# Patient Record
Sex: FEMALE | Race: BLACK OR AFRICAN AMERICAN | Employment: PART TIME | URBAN - METROPOLITAN AREA
[De-identification: names, ages, dates, MRNs, and addresses within clinical notes are randomized per-mention and may not be internally consistent; named-entity substitution may affect disease eponyms.]

---

## 2017-04-01 LAB
ABO/RH: ABNORMAL
ANTIBODY SCREEN: ABNORMAL
ANTIBODY SCREEN: POSITIVE
CHLAMYDIA CULTURE: NORMAL
CULTURE, GONORRHOEAE: NORMAL
HCT VFR BLD CALC: 34 % (ref 36–46)
HCT VFR BLD CALC: 34 % (ref 36–46)
HEMOGLOBIN: 11.7 G/DL (ref 12–16)
HEMOGLOBIN: 11.7 G/DL (ref 12–16)
HEPATITIS B SURFACE ANTIGEN: NEGATIVE
HEPATITIS B SURFACE ANTIGEN: NEGATIVE
HIV AG/AB: NONREACTIVE
HIV AG/AB: NORMAL
MCV RBC AUTO: 79 FL
PLATELET COUNT: 136
PLATELET COUNT: 136
RUBV IGG SER QL: 1.8
RUBV IGG SER QL: ABNORMAL
T. PALLIDUM, IGG: ABNORMAL
T. PALLIDUM, IGG: NONREACTIVE

## 2017-04-07 ENCOUNTER — INITIAL PRENATAL (OUTPATIENT)
Dept: OBGYN CLINIC | Age: 22
End: 2017-04-07
Payer: COMMERCIAL

## 2017-04-07 ENCOUNTER — HOSPITAL ENCOUNTER (OUTPATIENT)
Age: 22
Setting detail: SPECIMEN
Discharge: HOME | End: 2017-04-07
Payer: COMMERCIAL

## 2017-04-07 VITALS — HEART RATE: 102 BPM | WEIGHT: 147 LBS | SYSTOLIC BLOOD PRESSURE: 118 MMHG | DIASTOLIC BLOOD PRESSURE: 72 MMHG

## 2017-04-07 DIAGNOSIS — Z3A.21 21 WEEKS GESTATION OF PREGNANCY: Primary | ICD-10-CM

## 2017-04-07 DIAGNOSIS — D69.1 MATERNAL PLATELET DISORDER AFFECTING PREGNANCY IN SECOND TRIMESTER, ANTEPARTUM (HCC): ICD-10-CM

## 2017-04-07 DIAGNOSIS — O09.32 LATE PRENATAL CARE IN SECOND TRIMESTER: ICD-10-CM

## 2017-04-07 DIAGNOSIS — O99.112 MATERNAL PLATELET DISORDER AFFECTING PREGNANCY IN SECOND TRIMESTER, ANTEPARTUM (HCC): ICD-10-CM

## 2017-04-07 LAB
DIRECT EXAM: ABNORMAL
Lab: ABNORMAL
SPECIMEN DESCRIPTION: ABNORMAL
STATUS: ABNORMAL

## 2017-04-07 PROCEDURE — 99204 OFFICE O/P NEW MOD 45 MIN: CPT | Performed by: ADVANCED PRACTICE MIDWIFE

## 2017-04-07 RX ORDER — PNV,CALCIUM 72/IRON/FOLIC ACID 27 MG-1 MG
TABLET ORAL
Refills: 8 | COMMUNITY
Start: 2017-04-01 | End: 2019-04-03 | Stop reason: ALTCHOICE

## 2017-04-11 RX ORDER — METRONIDAZOLE 500 MG/1
500 TABLET ORAL ONCE
Qty: 4 TABLET | Refills: 0 | Status: SHIPPED | OUTPATIENT
Start: 2017-04-11 | End: 2017-04-11

## 2017-04-27 ENCOUNTER — HOSPITAL ENCOUNTER (OUTPATIENT)
Age: 22
Discharge: HOME | End: 2017-04-27
Payer: COMMERCIAL

## 2017-04-27 ENCOUNTER — ROUTINE PRENATAL (OUTPATIENT)
Dept: PERINATAL CARE | Age: 22
End: 2017-04-27
Payer: COMMERCIAL

## 2017-04-27 VITALS
BODY MASS INDEX: 28.32 KG/M2 | TEMPERATURE: 97.9 F | WEIGHT: 150 LBS | HEART RATE: 97 BPM | SYSTOLIC BLOOD PRESSURE: 118 MMHG | DIASTOLIC BLOOD PRESSURE: 70 MMHG | RESPIRATION RATE: 16 BRPM | HEIGHT: 61 IN

## 2017-04-27 DIAGNOSIS — D69.1 MATERNAL PLATELET DISORDER AFFECTING PREGNANCY IN SECOND TRIMESTER, ANTEPARTUM (HCC): ICD-10-CM

## 2017-04-27 DIAGNOSIS — O99.112 MATERNAL PLATELET DISORDER AFFECTING PREGNANCY IN SECOND TRIMESTER, ANTEPARTUM (HCC): ICD-10-CM

## 2017-04-27 DIAGNOSIS — O09.32 LATE PRENATAL CARE IN SECOND TRIMESTER: Primary | ICD-10-CM

## 2017-04-27 DIAGNOSIS — Z36.86 ENCOUNTER FOR SCREENING FOR RISK OF PRE-TERM LABOR: ICD-10-CM

## 2017-04-27 DIAGNOSIS — Z3A.23 23 WEEKS GESTATION OF PREGNANCY: ICD-10-CM

## 2017-04-27 PROCEDURE — 36415 COLL VENOUS BLD VENIPUNCTURE: CPT

## 2017-04-27 PROCEDURE — 76817 TRANSVAGINAL US OBSTETRIC: CPT | Performed by: OBSTETRICS & GYNECOLOGY

## 2017-04-27 PROCEDURE — 76811 OB US DETAILED SNGL FETUS: CPT | Performed by: OBSTETRICS & GYNECOLOGY

## 2017-04-27 PROCEDURE — 81511 FTL CGEN ABNOR FOUR ANAL: CPT

## 2017-04-28 LAB
AFP INTERPRETATION: NORMAL
AFP MOM: 0.92
AFP QUAD INTERPRETATION: NORMAL
AFP SPECIMEN: NORMAL
AFP: 96 NG/ML
DATE OF BIRTH: NORMAL
DATING METHOD: NORMAL
DETERMINED BY: NORMAL
DIABETIC: NO
DIMERIC INHIBIN A: 184 PG/ML
DUE DATE: NORMAL
ESTIMATED DUE DATE: NORMAL
FAMILY HISTORY NTD: NO
GESTATIONAL AGE: 23.14 WEEKS
HISTORY OF ANEUPLOIDY?: NORMAL
INHIBIN A MOM: 0.78
INSULIN REQ DIABETES: NO
LAST MENSTRUAL PERIOD: NORMAL
MATERNAL AGE AT EDD: 19.8 YR
MATERNAL WEIGHT: 150
MSHCG-MOM: 0.35
MSHCG: 6776 IU/L
NUMBER OF FETUSES: NORMAL
PATIENT WEIGHT: 150 LBS
PHYSICIAN: NORMAL
RACE (MATERNAL): NORMAL
RACE: NORMAL
UE3 MOM: 1.14
UE3 VALUE: 3.69 NG/ML
ZZ NTE CLEAN UP: HISTORY: NO

## 2017-05-16 ENCOUNTER — HOSPITAL ENCOUNTER (OUTPATIENT)
Age: 22
Setting detail: SPECIMEN
Discharge: HOME | End: 2017-05-16
Payer: COMMERCIAL

## 2017-05-16 ENCOUNTER — ROUTINE PRENATAL (OUTPATIENT)
Dept: OBGYN CLINIC | Age: 22
End: 2017-05-16
Payer: COMMERCIAL

## 2017-05-16 VITALS
WEIGHT: 153 LBS | HEART RATE: 106 BPM | DIASTOLIC BLOOD PRESSURE: 77 MMHG | BODY MASS INDEX: 28.91 KG/M2 | SYSTOLIC BLOOD PRESSURE: 142 MMHG

## 2017-05-16 DIAGNOSIS — D69.1 MATERNAL PLATELET DISORDER AFFECTING PREGNANCY IN SECOND TRIMESTER, ANTEPARTUM (HCC): Primary | ICD-10-CM

## 2017-05-16 DIAGNOSIS — A59.9 TRICHIMONIASIS: ICD-10-CM

## 2017-05-16 DIAGNOSIS — O99.112 MATERNAL PLATELET DISORDER AFFECTING PREGNANCY IN SECOND TRIMESTER, ANTEPARTUM (HCC): Primary | ICD-10-CM

## 2017-05-16 DIAGNOSIS — Z3A.25 25 WEEKS GESTATION OF PREGNANCY: ICD-10-CM

## 2017-05-16 DIAGNOSIS — D69.1 MATERNAL PLATELET DISORDER AFFECTING PREGNANCY IN SECOND TRIMESTER, ANTEPARTUM (HCC): ICD-10-CM

## 2017-05-16 DIAGNOSIS — O99.112 MATERNAL PLATELET DISORDER AFFECTING PREGNANCY IN SECOND TRIMESTER, ANTEPARTUM (HCC): ICD-10-CM

## 2017-05-16 DIAGNOSIS — O09.32 LATE PRENATAL CARE IN SECOND TRIMESTER: ICD-10-CM

## 2017-05-16 LAB
ABSOLUTE EOS #: 0.3 K/UL (ref 0–0.4)
ABSOLUTE LYMPH #: 1.6 K/UL (ref 1.2–5.2)
ABSOLUTE MONO #: 0.3 K/UL (ref 0.1–1.4)
BASOPHILS # BLD: 0 %
BASOPHILS ABSOLUTE: 0 K/UL (ref 0–0.2)
DIFFERENTIAL TYPE: ABNORMAL
DIRECT EXAM: ABNORMAL
EOSINOPHILS RELATIVE PERCENT: 4 %
GLUCOSE ADMINISTRATION: ABNORMAL
GLUCOSE TOLERANCE SCREEN 50G: 145 MG/DL (ref 70–135)
HCT VFR BLD CALC: 31.9 % (ref 36–46)
HEMOGLOBIN: 10.9 G/DL (ref 12–16)
LYMPHOCYTES # BLD: 22 %
Lab: ABNORMAL
MCH RBC QN AUTO: 27.8 PG (ref 26–34)
MCHC RBC AUTO-ENTMCNC: 34.1 G/DL (ref 31–37)
MCV RBC AUTO: 81.5 FL (ref 80–100)
MONOCYTES # BLD: 4 %
PDW BLD-RTO: 15.5 % (ref 12.5–15.4)
PLATELET # BLD: 136 K/UL (ref 140–450)
PLATELET ESTIMATE: ABNORMAL
PMV BLD AUTO: 9.8 FL (ref 6–12)
RBC # BLD: 3.91 M/UL (ref 4–5.2)
RBC # BLD: ABNORMAL 10*6/UL
SEG NEUTROPHILS: 70 %
SEGMENTED NEUTROPHILS ABSOLUTE COUNT: 5.1 K/UL (ref 1.8–8)
SPECIMEN DESCRIPTION: ABNORMAL
STATUS: ABNORMAL
WBC # BLD: 7.2 K/UL (ref 4.5–13.5)
WBC # BLD: ABNORMAL 10*3/UL

## 2017-05-16 PROCEDURE — 99213 OFFICE O/P EST LOW 20 MIN: CPT | Performed by: ADVANCED PRACTICE MIDWIFE

## 2017-05-16 PROCEDURE — 36415 COLL VENOUS BLD VENIPUNCTURE: CPT | Performed by: ADVANCED PRACTICE MIDWIFE

## 2017-05-18 ENCOUNTER — TELEPHONE (OUTPATIENT)
Dept: OBGYN CLINIC | Age: 22
End: 2017-05-18

## 2017-05-18 DIAGNOSIS — R73.09 ELEVATED GLUCOSE TOLERANCE TEST: Primary | ICD-10-CM

## 2017-05-18 DIAGNOSIS — B96.89 BACTERIAL VAGINAL INFECTION: ICD-10-CM

## 2017-05-18 DIAGNOSIS — N76.0 BACTERIAL VAGINAL INFECTION: ICD-10-CM

## 2017-05-18 RX ORDER — METRONIDAZOLE 500 MG/1
500 TABLET ORAL 2 TIMES DAILY
Qty: 14 TABLET | Refills: 0 | Status: SHIPPED | OUTPATIENT
Start: 2017-05-18 | End: 2017-05-25

## 2017-06-06 ENCOUNTER — TELEPHONE (OUTPATIENT)
Dept: OBGYN CLINIC | Age: 22
End: 2017-06-06

## 2017-06-07 ENCOUNTER — HOSPITAL ENCOUNTER (OUTPATIENT)
Age: 22
Setting detail: SPECIMEN
Discharge: HOME | End: 2017-06-07
Payer: COMMERCIAL

## 2017-06-07 ENCOUNTER — ROUTINE PRENATAL (OUTPATIENT)
Dept: OBGYN CLINIC | Age: 22
End: 2017-06-07
Payer: COMMERCIAL

## 2017-06-07 VITALS
HEART RATE: 97 BPM | SYSTOLIC BLOOD PRESSURE: 133 MMHG | DIASTOLIC BLOOD PRESSURE: 82 MMHG | BODY MASS INDEX: 30.8 KG/M2 | WEIGHT: 163 LBS

## 2017-06-07 DIAGNOSIS — B96.89 BACTERIAL VAGINAL INFECTION: ICD-10-CM

## 2017-06-07 DIAGNOSIS — N76.0 BACTERIAL VAGINAL INFECTION: ICD-10-CM

## 2017-06-07 DIAGNOSIS — O09.92 SUPERVISION OF HIGH RISK PREGNANCY, ANTEPARTUM, SECOND TRIMESTER: ICD-10-CM

## 2017-06-07 DIAGNOSIS — Z3A.29 29 WEEKS GESTATION OF PREGNANCY: ICD-10-CM

## 2017-06-07 DIAGNOSIS — R73.09 ELEVATED GLUCOSE TOLERANCE TEST: Primary | ICD-10-CM

## 2017-06-07 LAB
DIRECT EXAM: NORMAL
Lab: NORMAL
SPECIMEN DESCRIPTION: NORMAL
STATUS: NORMAL

## 2017-06-07 PROCEDURE — 99213 OFFICE O/P EST LOW 20 MIN: CPT | Performed by: ADVANCED PRACTICE MIDWIFE

## 2017-06-29 ENCOUNTER — ROUTINE PRENATAL (OUTPATIENT)
Dept: PERINATAL CARE | Age: 22
End: 2017-06-29
Payer: COMMERCIAL

## 2017-06-29 VITALS
HEART RATE: 92 BPM | WEIGHT: 165 LBS | DIASTOLIC BLOOD PRESSURE: 76 MMHG | RESPIRATION RATE: 16 BRPM | BODY MASS INDEX: 31.15 KG/M2 | TEMPERATURE: 98.3 F | HEIGHT: 61 IN | SYSTOLIC BLOOD PRESSURE: 122 MMHG

## 2017-06-29 DIAGNOSIS — Z36.4 ANTENATAL SCREENING FOR FETAL GROWTH RETARDATION USING ULTRASONICS: ICD-10-CM

## 2017-06-29 DIAGNOSIS — O09.33 INSUFFICIENT PRENATAL CARE, THIRD TRIMESTER: Primary | ICD-10-CM

## 2017-06-29 DIAGNOSIS — Z13.89 ENCOUNTER FOR ROUTINE SCREENING FOR MALFORMATION USING ULTRASONICS: ICD-10-CM

## 2017-06-29 DIAGNOSIS — O99.810 ABNORMAL MATERNAL GLUCOSE TOLERANCE, ANTEPARTUM: ICD-10-CM

## 2017-06-29 DIAGNOSIS — Z3A.32 32 WEEKS GESTATION OF PREGNANCY: ICD-10-CM

## 2017-06-29 PROCEDURE — 76805 OB US >/= 14 WKS SNGL FETUS: CPT | Performed by: OBSTETRICS & GYNECOLOGY

## 2017-06-29 PROCEDURE — 76819 FETAL BIOPHYS PROFIL W/O NST: CPT | Performed by: OBSTETRICS & GYNECOLOGY

## 2017-07-05 ENCOUNTER — HOSPITAL ENCOUNTER (OUTPATIENT)
Age: 22
Setting detail: SPECIMEN
Discharge: HOME | End: 2017-07-05
Payer: COMMERCIAL

## 2017-07-05 ENCOUNTER — ROUTINE PRENATAL (OUTPATIENT)
Dept: OBGYN CLINIC | Age: 22
End: 2017-07-05
Payer: COMMERCIAL

## 2017-07-05 VITALS
SYSTOLIC BLOOD PRESSURE: 135 MMHG | HEART RATE: 102 BPM | WEIGHT: 167.6 LBS | BODY MASS INDEX: 31.67 KG/M2 | DIASTOLIC BLOOD PRESSURE: 75 MMHG

## 2017-07-05 DIAGNOSIS — D69.1 MATERNAL PLATELET DISORDER AFFECTING PREGNANCY IN SECOND TRIMESTER, ANTEPARTUM (HCC): ICD-10-CM

## 2017-07-05 DIAGNOSIS — R31.9 HEMATURIA: ICD-10-CM

## 2017-07-05 DIAGNOSIS — O09.90 HIGH RISK PREGNANCY, ANTEPARTUM: ICD-10-CM

## 2017-07-05 DIAGNOSIS — O99.112 MATERNAL PLATELET DISORDER AFFECTING PREGNANCY IN SECOND TRIMESTER, ANTEPARTUM (HCC): ICD-10-CM

## 2017-07-05 DIAGNOSIS — Z23 NEED FOR DIPHTHERIA-TETANUS-PERTUSSIS (TDAP) VACCINE: ICD-10-CM

## 2017-07-05 DIAGNOSIS — Z3A.33 33 WEEKS GESTATION OF PREGNANCY: ICD-10-CM

## 2017-07-05 DIAGNOSIS — O09.90 HIGH RISK PREGNANCY, ANTEPARTUM: Primary | ICD-10-CM

## 2017-07-05 LAB
HCT VFR BLD CALC: 34 % (ref 36–46)
HEMOGLOBIN: 11.7 G/DL (ref 12–16)
MCH RBC QN AUTO: 28.1 PG (ref 26–34)
MCHC RBC AUTO-ENTMCNC: 34.3 G/DL (ref 31–37)
MCV RBC AUTO: 82 FL (ref 80–100)
PDW BLD-RTO: 15.3 % (ref 12.5–15.4)
PLATELET # BLD: 129 K/UL (ref 140–450)
PMV BLD AUTO: 9.7 FL (ref 6–12)
RBC # BLD: 4.15 M/UL (ref 4–5.2)
WBC # BLD: 8.4 K/UL (ref 4.5–13.5)

## 2017-07-05 PROCEDURE — 99213 OFFICE O/P EST LOW 20 MIN: CPT | Performed by: ADVANCED PRACTICE MIDWIFE

## 2017-07-05 PROCEDURE — 90715 TDAP VACCINE 7 YRS/> IM: CPT | Performed by: ADVANCED PRACTICE MIDWIFE

## 2017-07-05 PROCEDURE — 90471 IMMUNIZATION ADMIN: CPT | Performed by: ADVANCED PRACTICE MIDWIFE

## 2017-07-06 ENCOUNTER — PATIENT MESSAGE (OUTPATIENT)
Dept: OBGYN CLINIC | Age: 22
End: 2017-07-06

## 2017-07-06 LAB
CULTURE: NORMAL
CULTURE: NORMAL
Lab: NORMAL
SPECIMEN DESCRIPTION: NORMAL
STATUS: NORMAL

## 2017-07-26 ENCOUNTER — HOSPITAL ENCOUNTER (OUTPATIENT)
Age: 22
Setting detail: SPECIMEN
Discharge: HOME | End: 2017-07-26
Payer: COMMERCIAL

## 2017-07-26 ENCOUNTER — ROUTINE PRENATAL (OUTPATIENT)
Dept: OBGYN CLINIC | Age: 22
End: 2017-07-26
Payer: COMMERCIAL

## 2017-07-26 VITALS
WEIGHT: 174 LBS | HEART RATE: 100 BPM | DIASTOLIC BLOOD PRESSURE: 81 MMHG | BODY MASS INDEX: 32.88 KG/M2 | SYSTOLIC BLOOD PRESSURE: 130 MMHG

## 2017-07-26 DIAGNOSIS — Z34.93 PRENATAL CARE, THIRD TRIMESTER: ICD-10-CM

## 2017-07-26 DIAGNOSIS — Z3A.36 36 WEEKS GESTATION OF PREGNANCY: Primary | ICD-10-CM

## 2017-07-26 PROCEDURE — 99213 OFFICE O/P EST LOW 20 MIN: CPT | Performed by: ADVANCED PRACTICE MIDWIFE

## 2017-07-29 LAB
CULTURE: NORMAL
CULTURE: NORMAL
Lab: NORMAL
SPECIMEN DESCRIPTION: NORMAL
STATUS: NORMAL

## 2017-07-31 ENCOUNTER — TELEPHONE (OUTPATIENT)
Dept: OBGYN CLINIC | Age: 22
End: 2017-07-31

## 2017-08-02 ENCOUNTER — ROUTINE PRENATAL (OUTPATIENT)
Dept: OBGYN CLINIC | Age: 22
End: 2017-08-02
Payer: COMMERCIAL

## 2017-08-02 VITALS
BODY MASS INDEX: 33.44 KG/M2 | SYSTOLIC BLOOD PRESSURE: 124 MMHG | WEIGHT: 177 LBS | HEART RATE: 98 BPM | DIASTOLIC BLOOD PRESSURE: 80 MMHG

## 2017-08-02 DIAGNOSIS — O09.90 HIGH RISK PREGNANCY, ANTEPARTUM: Primary | Chronic | ICD-10-CM

## 2017-08-02 DIAGNOSIS — Z3A.37 37 WEEKS GESTATION OF PREGNANCY: ICD-10-CM

## 2017-08-02 DIAGNOSIS — R73.09 ELEVATED GLUCOSE TOLERANCE TEST: ICD-10-CM

## 2017-08-02 DIAGNOSIS — D69.1 MATERNAL PLATELET DISORDER AFFECTING PREGNANCY IN SECOND TRIMESTER, ANTEPARTUM (HCC): ICD-10-CM

## 2017-08-02 DIAGNOSIS — O99.112 MATERNAL PLATELET DISORDER AFFECTING PREGNANCY IN SECOND TRIMESTER, ANTEPARTUM (HCC): ICD-10-CM

## 2017-08-02 PROBLEM — N76.0 BACTERIAL VAGINAL INFECTION: Status: RESOLVED | Noted: 2017-05-18 | Resolved: 2017-08-02

## 2017-08-02 PROBLEM — B96.89 BACTERIAL VAGINAL INFECTION: Status: RESOLVED | Noted: 2017-05-18 | Resolved: 2017-08-02

## 2017-08-02 PROCEDURE — 99213 OFFICE O/P EST LOW 20 MIN: CPT | Performed by: ADVANCED PRACTICE MIDWIFE

## 2017-08-08 ENCOUNTER — ROUTINE PRENATAL (OUTPATIENT)
Dept: OBGYN CLINIC | Age: 22
End: 2017-08-08
Payer: COMMERCIAL

## 2017-08-08 ENCOUNTER — HOSPITAL ENCOUNTER (OUTPATIENT)
Age: 22
Discharge: HOME | End: 2017-08-08
Attending: OBSTETRICS & GYNECOLOGY | Admitting: OBSTETRICS & GYNECOLOGY
Payer: COMMERCIAL

## 2017-08-08 VITALS
BODY MASS INDEX: 33.04 KG/M2 | HEART RATE: 86 BPM | HEIGHT: 61 IN | RESPIRATION RATE: 14 BRPM | DIASTOLIC BLOOD PRESSURE: 74 MMHG | TEMPERATURE: 98.2 F | SYSTOLIC BLOOD PRESSURE: 129 MMHG | WEIGHT: 175 LBS

## 2017-08-08 VITALS — DIASTOLIC BLOOD PRESSURE: 89 MMHG | SYSTOLIC BLOOD PRESSURE: 138 MMHG | BODY MASS INDEX: 33.07 KG/M2 | WEIGHT: 175 LBS

## 2017-08-08 DIAGNOSIS — Z3A.38 38 WEEKS GESTATION OF PREGNANCY: ICD-10-CM

## 2017-08-08 DIAGNOSIS — R03.0 ELEVATED BP WITHOUT DIAGNOSIS OF HYPERTENSION: Primary | ICD-10-CM

## 2017-08-08 PROBLEM — F12.10 TETRAHYDROCANNABINOL (THC) USE DISORDER, MILD, ABUSE: Status: ACTIVE | Noted: 2017-08-08

## 2017-08-08 LAB
-: ABNORMAL
ALBUMIN SERPL-MCNC: 3.2 G/DL (ref 3.5–5.2)
ALBUMIN/GLOBULIN RATIO: 1.1 (ref 1–2.5)
ALP BLD-CCNC: 155 U/L (ref 35–104)
ALT SERPL-CCNC: 10 U/L (ref 5–33)
AMORPHOUS: ABNORMAL
ANION GAP SERPL CALCULATED.3IONS-SCNC: 17 MMOL/L (ref 9–17)
AST SERPL-CCNC: 16 U/L
BACTERIA: ABNORMAL
BILIRUB SERPL-MCNC: 0.32 MG/DL (ref 0.3–1.2)
BILIRUBIN URINE: NEGATIVE
BUN BLDV-MCNC: 4 MG/DL (ref 6–20)
BUN/CREAT BLD: ABNORMAL (ref 9–20)
CALCIUM SERPL-MCNC: 8.8 MG/DL (ref 8.6–10.4)
CASTS UA: ABNORMAL /LPF (ref 0–8)
CHLORIDE BLD-SCNC: 103 MMOL/L (ref 98–107)
CO2: 20 MMOL/L (ref 20–31)
COLOR: ABNORMAL
CREAT SERPL-MCNC: 0.3 MG/DL (ref 0.5–0.9)
CREATININE URINE: 151.2 MG/DL (ref 28–217)
CRYSTALS, UA: ABNORMAL /HPF
EPITHELIAL CELLS UA: ABNORMAL /HPF (ref 0–5)
GFR AFRICAN AMERICAN: ABNORMAL ML/MIN
GFR NON-AFRICAN AMERICAN: ABNORMAL ML/MIN
GFR SERPL CREATININE-BSD FRML MDRD: ABNORMAL ML/MIN/{1.73_M2}
GFR SERPL CREATININE-BSD FRML MDRD: ABNORMAL ML/MIN/{1.73_M2}
GLUCOSE BLD-MCNC: 124 MG/DL (ref 70–99)
GLUCOSE URINE: NEGATIVE
HCT VFR BLD CALC: 36.2 % (ref 36–46)
HEMOGLOBIN: 12.4 G/DL (ref 12–16)
KETONES, URINE: ABNORMAL
LEUKOCYTE ESTERASE, URINE: ABNORMAL
MCH RBC QN AUTO: 28.1 PG (ref 26–34)
MCHC RBC AUTO-ENTMCNC: 34.2 G/DL (ref 31–37)
MCV RBC AUTO: 82.1 FL (ref 80–100)
MUCUS: ABNORMAL
NITRITE, URINE: NEGATIVE
OTHER OBSERVATIONS UA: ABNORMAL
PDW BLD-RTO: 15.2 % (ref 12.5–15.4)
PH UA: 6.5 (ref 5–8)
PLATELET # BLD: 122 K/UL (ref 140–450)
PMV BLD AUTO: 9.5 FL (ref 6–12)
POTASSIUM SERPL-SCNC: 3.5 MMOL/L (ref 3.7–5.3)
PROTEIN UA: ABNORMAL
RBC # BLD: 4.42 M/UL (ref 4–5.2)
RBC UA: ABNORMAL /HPF (ref 0–4)
RENAL EPITHELIAL, UA: ABNORMAL /HPF
SODIUM BLD-SCNC: 140 MMOL/L (ref 135–144)
SPECIFIC GRAVITY UA: 1.02 (ref 1–1.03)
TOTAL PROTEIN, URINE: 44 MG/DL
TOTAL PROTEIN: 6.2 G/DL (ref 6.4–8.3)
TRICHOMONAS: ABNORMAL
TURBIDITY: ABNORMAL
URIC ACID: 3.7 MG/DL (ref 2.4–5.7)
URINE HGB: ABNORMAL
URINE TOTAL PROTEIN CREATININE RATIO: 0.29 (ref 0–0.2)
UROBILINOGEN, URINE: NORMAL
WBC # BLD: 5.6 K/UL (ref 4.5–13.5)
WBC UA: ABNORMAL /HPF (ref 0–5)
YEAST: ABNORMAL

## 2017-08-08 PROCEDURE — 6370000000 HC RX 637 (ALT 250 FOR IP): Performed by: STUDENT IN AN ORGANIZED HEALTH CARE EDUCATION/TRAINING PROGRAM

## 2017-08-08 PROCEDURE — 84156 ASSAY OF PROTEIN URINE: CPT

## 2017-08-08 PROCEDURE — 81001 URINALYSIS AUTO W/SCOPE: CPT

## 2017-08-08 PROCEDURE — 99213 OFFICE O/P EST LOW 20 MIN: CPT | Performed by: ADVANCED PRACTICE MIDWIFE

## 2017-08-08 PROCEDURE — 85027 COMPLETE CBC AUTOMATED: CPT

## 2017-08-08 PROCEDURE — 99213 OFFICE O/P EST LOW 20 MIN: CPT

## 2017-08-08 PROCEDURE — 82570 ASSAY OF URINE CREATININE: CPT

## 2017-08-08 PROCEDURE — 80053 COMPREHEN METABOLIC PANEL: CPT

## 2017-08-08 PROCEDURE — 84550 ASSAY OF BLOOD/URIC ACID: CPT

## 2017-08-08 PROCEDURE — 87086 URINE CULTURE/COLONY COUNT: CPT

## 2017-08-08 RX ORDER — ONDANSETRON 2 MG/ML
4 INJECTION INTRAMUSCULAR; INTRAVENOUS EVERY 6 HOURS PRN
Status: DISCONTINUED | OUTPATIENT
Start: 2017-08-08 | End: 2017-08-08 | Stop reason: HOSPADM

## 2017-08-08 RX ORDER — ACETAMINOPHEN 500 MG
1000 TABLET ORAL EVERY 6 HOURS PRN
Status: DISCONTINUED | OUTPATIENT
Start: 2017-08-08 | End: 2017-08-08 | Stop reason: HOSPADM

## 2017-08-08 RX ADMIN — ACETAMINOPHEN 1000 MG: 500 TABLET ORAL at 18:10

## 2017-08-08 ASSESSMENT — PAIN SCALES - GENERAL: PAINLEVEL_OUTOF10: 8

## 2017-08-09 LAB
CULTURE: NORMAL
CULTURE: NORMAL
Lab: NORMAL
SPECIMEN DESCRIPTION: NORMAL
STATUS: NORMAL

## 2017-08-13 ENCOUNTER — HOSPITAL ENCOUNTER (OUTPATIENT)
Age: 22
Discharge: ANOTHER HOSPITAL | End: 2017-08-13
Attending: OBSTETRICS & GYNECOLOGY | Admitting: OBSTETRICS & GYNECOLOGY
Payer: COMMERCIAL

## 2017-08-13 VITALS
DIASTOLIC BLOOD PRESSURE: 88 MMHG | SYSTOLIC BLOOD PRESSURE: 132 MMHG | TEMPERATURE: 98.4 F | RESPIRATION RATE: 16 BRPM | HEART RATE: 86 BPM

## 2017-08-13 PROBLEM — Z91.199 NONCOMPLIANCE: Status: ACTIVE | Noted: 2017-08-13

## 2017-08-13 PROBLEM — O13.1 GESTATIONAL (PREGNANCY-INDUCED) HYPERTENSION WITHOUT SIGNIFICANT PROTEINURIA, FIRST TRIMESTER: Status: ACTIVE | Noted: 2017-08-13

## 2017-08-13 LAB
ABSOLUTE EOS #: 0.2 K/UL (ref 0–0.4)
ABSOLUTE LYMPH #: 1.5 K/UL (ref 1.2–5.2)
ABSOLUTE MONO #: 0.5 K/UL (ref 0.1–1.3)
ALBUMIN SERPL-MCNC: 3.1 G/DL (ref 3.5–5.2)
ALBUMIN/GLOBULIN RATIO: ABNORMAL (ref 1–2.5)
ALP BLD-CCNC: 163 U/L (ref 35–104)
ALT SERPL-CCNC: 10 U/L (ref 5–33)
ANION GAP SERPL CALCULATED.3IONS-SCNC: 13 MMOL/L (ref 9–17)
AST SERPL-CCNC: 18 U/L
BASOPHILS # BLD: 1 %
BASOPHILS ABSOLUTE: 0 K/UL (ref 0–0.2)
BILIRUB SERPL-MCNC: 0.32 MG/DL (ref 0.3–1.2)
BUN BLDV-MCNC: 3 MG/DL (ref 6–20)
BUN/CREAT BLD: ABNORMAL (ref 9–20)
CALCIUM SERPL-MCNC: 8.7 MG/DL (ref 8.6–10.4)
CHLORIDE BLD-SCNC: 104 MMOL/L (ref 98–107)
CO2: 22 MMOL/L (ref 20–31)
COLLAGEN ADENOSINE-5'-DIPHOSPHATE (ADP) TIME: 91 SEC (ref 67–112)
COLLAGEN EPINEPHRINE TIME: 117 SEC (ref 85–172)
CREAT SERPL-MCNC: <0.4 MG/DL (ref 0.5–0.9)
CREATININE URINE: 109.1 MG/DL (ref 28–217)
DIFFERENTIAL TYPE: ABNORMAL
EOSINOPHILS RELATIVE PERCENT: 3 %
GFR AFRICAN AMERICAN: ABNORMAL ML/MIN
GFR NON-AFRICAN AMERICAN: ABNORMAL ML/MIN
GFR SERPL CREATININE-BSD FRML MDRD: ABNORMAL ML/MIN/{1.73_M2}
GFR SERPL CREATININE-BSD FRML MDRD: ABNORMAL ML/MIN/{1.73_M2}
GLUCOSE BLD-MCNC: 91 MG/DL (ref 70–99)
HCT VFR BLD CALC: 36.5 % (ref 36–46)
HEMOGLOBIN: 12.5 G/DL (ref 12–16)
INR BLD: 0.9
LACTATE DEHYDROGENASE: 238 U/L (ref 135–214)
LYMPHOCYTES # BLD: 25 %
MCH RBC QN AUTO: 28.2 PG (ref 26–34)
MCHC RBC AUTO-ENTMCNC: 34.2 G/DL (ref 31–37)
MCV RBC AUTO: 82.5 FL (ref 80–100)
MONOCYTES # BLD: 8 %
PARTIAL THROMBOPLASTIN TIME: 32.2 SEC (ref 23–31)
PDW BLD-RTO: 14.2 % (ref 11.5–14.9)
PLATELET # BLD: 102 K/UL (ref 150–450)
PLATELET ESTIMATE: ABNORMAL
PLATELET FUNCTION INTERP: NORMAL
PMV BLD AUTO: 9.8 FL (ref 6–12)
POTASSIUM SERPL-SCNC: 3.8 MMOL/L (ref 3.7–5.3)
PROTHROMBIN TIME: 10 SEC (ref 9.7–12)
RBC # BLD: 4.42 M/UL (ref 4–5.2)
RBC # BLD: ABNORMAL 10*6/UL
SEG NEUTROPHILS: 63 %
SEGMENTED NEUTROPHILS ABSOLUTE COUNT: 3.8 K/UL (ref 1.3–9.1)
SODIUM BLD-SCNC: 139 MMOL/L (ref 135–144)
TOTAL PROTEIN, URINE: 20 MG/DL
TOTAL PROTEIN: 6 G/DL (ref 6.4–8.3)
URIC ACID: 3.9 MG/DL (ref 2.4–5.7)
URINE TOTAL PROTEIN CREATININE RATIO: 0.18 (ref 0–0.2)
WBC # BLD: 5.9 K/UL (ref 4.5–13.5)
WBC # BLD: ABNORMAL 10*3/UL

## 2017-08-13 PROCEDURE — 84156 ASSAY OF PROTEIN URINE: CPT

## 2017-08-13 PROCEDURE — 85576 BLOOD PLATELET AGGREGATION: CPT

## 2017-08-13 PROCEDURE — 85610 PROTHROMBIN TIME: CPT

## 2017-08-13 PROCEDURE — 80053 COMPREHEN METABOLIC PANEL: CPT

## 2017-08-13 PROCEDURE — 99213 OFFICE O/P EST LOW 20 MIN: CPT

## 2017-08-13 PROCEDURE — 36415 COLL VENOUS BLD VENIPUNCTURE: CPT

## 2017-08-13 PROCEDURE — 85025 COMPLETE CBC W/AUTO DIFF WBC: CPT

## 2017-08-13 PROCEDURE — 83615 LACTATE (LD) (LDH) ENZYME: CPT

## 2017-08-13 PROCEDURE — 85730 THROMBOPLASTIN TIME PARTIAL: CPT

## 2017-08-13 PROCEDURE — 84550 ASSAY OF BLOOD/URIC ACID: CPT

## 2017-08-13 PROCEDURE — 82570 ASSAY OF URINE CREATININE: CPT

## 2017-08-13 RX ORDER — ACETAMINOPHEN 325 MG/1
650 TABLET ORAL EVERY 4 HOURS PRN
Status: DISCONTINUED | OUTPATIENT
Start: 2017-08-13 | End: 2017-08-14 | Stop reason: HOSPADM

## 2017-08-14 ENCOUNTER — ANESTHESIA (OUTPATIENT)
Dept: LABOR AND DELIVERY | Age: 22
DRG: 560 | End: 2017-08-14
Payer: COMMERCIAL

## 2017-08-14 ENCOUNTER — HOSPITAL ENCOUNTER (INPATIENT)
Age: 22
LOS: 2 days | Discharge: HOME | DRG: 560 | End: 2017-08-17
Attending: OBSTETRICS & GYNECOLOGY | Admitting: OBSTETRICS & GYNECOLOGY
Payer: COMMERCIAL

## 2017-08-14 ENCOUNTER — ANESTHESIA EVENT (OUTPATIENT)
Dept: LABOR AND DELIVERY | Age: 22
DRG: 560 | End: 2017-08-14
Payer: COMMERCIAL

## 2017-08-14 PROBLEM — O36.8190 DECREASED FETAL MOVEMENT: Status: ACTIVE | Noted: 2017-08-14

## 2017-08-14 LAB
-: ABNORMAL
ABO/RH: NORMAL
AMORPHOUS: ABNORMAL
AMPHETAMINE SCREEN URINE: NEGATIVE
ANTIBODY SCREEN: NEGATIVE
ARM BAND NUMBER: NORMAL
BACTERIA: ABNORMAL
BARBITURATE SCREEN URINE: NEGATIVE
BENZODIAZEPINE SCREEN, URINE: NEGATIVE
BILIRUBIN URINE: NEGATIVE
BUPRENORPHINE URINE: NORMAL
CANNABINOID SCREEN URINE: NEGATIVE
CASTS UA: ABNORMAL /LPF (ref 0–2)
COCAINE METABOLITE, URINE: NEGATIVE
COLOR: YELLOW
COMMENT UA: ABNORMAL
CRYSTALS, UA: ABNORMAL /HPF
EPITHELIAL CELLS UA: ABNORMAL /HPF (ref 0–5)
EXPIRATION DATE: NORMAL
GLUCOSE URINE: NEGATIVE
KETONES, URINE: ABNORMAL
LEUKOCYTE ESTERASE, URINE: ABNORMAL
MDMA URINE: NORMAL
METHADONE SCREEN, URINE: NEGATIVE
METHAMPHETAMINE, URINE: NORMAL
MUCUS: ABNORMAL
NITRITE, URINE: NEGATIVE
OPIATES, URINE: NEGATIVE
OTHER OBSERVATIONS UA: ABNORMAL
OXYCODONE SCREEN URINE: NEGATIVE
PH UA: 7.5 (ref 5–8)
PHENCYCLIDINE, URINE: NEGATIVE
PROPOXYPHENE, URINE: NORMAL
PROTEIN UA: ABNORMAL
RBC UA: ABNORMAL /HPF (ref 0–2)
RENAL EPITHELIAL, UA: ABNORMAL /HPF
SPECIFIC GRAVITY UA: 1.01 (ref 1–1.03)
TEST INFORMATION: NORMAL
TRICHOMONAS: ABNORMAL
TRICYCLIC ANTIDEPRESSANTS, UR: NORMAL
TURBIDITY: ABNORMAL
URINE HGB: ABNORMAL
UROBILINOGEN, URINE: NORMAL
WBC UA: ABNORMAL /HPF (ref 0–5)
YEAST: ABNORMAL

## 2017-08-14 PROCEDURE — 86850 RBC ANTIBODY SCREEN: CPT

## 2017-08-14 PROCEDURE — 6360000002 HC RX W HCPCS: Performed by: ANESTHESIOLOGY

## 2017-08-14 PROCEDURE — 3700000025 ANESTHESIA EPIDURAL BLOCK: Performed by: ANESTHESIOLOGY

## 2017-08-14 PROCEDURE — 86900 BLOOD TYPING SEROLOGIC ABO: CPT

## 2017-08-14 PROCEDURE — 59050 FETAL MONITOR W/REPORT: CPT

## 2017-08-14 PROCEDURE — 96365 THER/PROPH/DIAG IV INF INIT: CPT

## 2017-08-14 PROCEDURE — 80307 DRUG TEST PRSMV CHEM ANLYZR: CPT

## 2017-08-14 PROCEDURE — 6360000002 HC RX W HCPCS: Performed by: ADVANCED PRACTICE MIDWIFE

## 2017-08-14 PROCEDURE — 6360000002 HC RX W HCPCS: Performed by: NURSE ANESTHETIST, CERTIFIED REGISTERED

## 2017-08-14 PROCEDURE — 86901 BLOOD TYPING SEROLOGIC RH(D): CPT

## 2017-08-14 PROCEDURE — 87086 URINE CULTURE/COLONY COUNT: CPT

## 2017-08-14 PROCEDURE — 2580000003 HC RX 258: Performed by: ADVANCED PRACTICE MIDWIFE

## 2017-08-14 PROCEDURE — 10907ZC DRAINAGE OF AMNIOTIC FLUID, THERAPEUTIC FROM PRODUCTS OF CONCEPTION, VIA NATURAL OR ARTIFICIAL OPENING: ICD-10-PCS | Performed by: ADVANCED PRACTICE MIDWIFE

## 2017-08-14 PROCEDURE — 81001 URINALYSIS AUTO W/SCOPE: CPT

## 2017-08-14 PROCEDURE — 96366 THER/PROPH/DIAG IV INF ADDON: CPT

## 2017-08-14 RX ORDER — SODIUM CHLORIDE 0.9 % (FLUSH) 0.9 %
10 SYRINGE (ML) INJECTION PRN
Status: DISCONTINUED | OUTPATIENT
Start: 2017-08-14 | End: 2017-08-15

## 2017-08-14 RX ORDER — ROPIVACAINE HYDROCHLORIDE 2 MG/ML
INJECTION, SOLUTION EPIDURAL; INFILTRATION; PERINEURAL CONTINUOUS PRN
Status: DISCONTINUED | OUTPATIENT
Start: 2017-08-14 | End: 2017-08-15 | Stop reason: SDUPTHER

## 2017-08-14 RX ORDER — NALOXONE HYDROCHLORIDE 0.4 MG/ML
0.4 INJECTION, SOLUTION INTRAMUSCULAR; INTRAVENOUS; SUBCUTANEOUS PRN
Status: DISCONTINUED | OUTPATIENT
Start: 2017-08-14 | End: 2017-08-15

## 2017-08-14 RX ORDER — ONDANSETRON 2 MG/ML
4 INJECTION INTRAMUSCULAR; INTRAVENOUS EVERY 6 HOURS PRN
Status: DISCONTINUED | OUTPATIENT
Start: 2017-08-14 | End: 2017-08-15

## 2017-08-14 RX ORDER — SODIUM CHLORIDE, SODIUM LACTATE, POTASSIUM CHLORIDE, CALCIUM CHLORIDE 600; 310; 30; 20 MG/100ML; MG/100ML; MG/100ML; MG/100ML
INJECTION, SOLUTION INTRAVENOUS CONTINUOUS
Status: DISCONTINUED | OUTPATIENT
Start: 2017-08-14 | End: 2017-08-15

## 2017-08-14 RX ORDER — ACETAMINOPHEN 325 MG/1
650 TABLET ORAL EVERY 4 HOURS PRN
Status: DISCONTINUED | OUTPATIENT
Start: 2017-08-14 | End: 2017-08-15

## 2017-08-14 RX ORDER — ONDANSETRON 4 MG/1
4 TABLET, FILM COATED ORAL EVERY 8 HOURS PRN
Status: DISCONTINUED | OUTPATIENT
Start: 2017-08-14 | End: 2017-08-15

## 2017-08-14 RX ORDER — DOCUSATE SODIUM 100 MG/1
100 CAPSULE, LIQUID FILLED ORAL 2 TIMES DAILY
Status: DISCONTINUED | OUTPATIENT
Start: 2017-08-14 | End: 2017-08-15

## 2017-08-14 RX ORDER — ROPIVACAINE HYDROCHLORIDE 2 MG/ML
INJECTION, SOLUTION EPIDURAL; INFILTRATION; PERINEURAL PRN
Status: DISCONTINUED | OUTPATIENT
Start: 2017-08-14 | End: 2017-08-15 | Stop reason: SDUPTHER

## 2017-08-14 RX ORDER — SODIUM CHLORIDE 0.9 % (FLUSH) 0.9 %
10 SYRINGE (ML) INJECTION EVERY 12 HOURS SCHEDULED
Status: DISCONTINUED | OUTPATIENT
Start: 2017-08-14 | End: 2017-08-15

## 2017-08-14 RX ADMIN — ROPIVACAINE HYDROCHLORIDE 5 ML: 2 INJECTION, SOLUTION EPIDURAL; INFILTRATION at 20:18

## 2017-08-14 RX ADMIN — SODIUM CHLORIDE, POTASSIUM CHLORIDE, SODIUM LACTATE AND CALCIUM CHLORIDE: 600; 310; 30; 20 INJECTION, SOLUTION INTRAVENOUS at 11:41

## 2017-08-14 RX ADMIN — ROPIVACAINE HYDROCHLORIDE 10 ML/HR: 2 INJECTION, SOLUTION EPIDURAL; INFILTRATION at 20:24

## 2017-08-14 RX ADMIN — Medication 1 MILLI-UNITS/MIN: at 11:41

## 2017-08-14 RX ADMIN — ROPIVACAINE HYDROCHLORIDE 5 ML: 2 INJECTION, SOLUTION EPIDURAL; INFILTRATION at 20:23

## 2017-08-14 RX ADMIN — ROPIVACAINE HYDROCHLORIDE 10 ML/HR: 2 INJECTION, SOLUTION EPIDURAL; INFILTRATION at 20:30

## 2017-08-14 NOTE — H&P
hr    Trichomoniasis in pregnancy   - REMY neg 5/16/17    THC use   - UDS pos THC 4/1/17   - Cessation encouraged   - ROME pending    Noncompliance   -Patient did not complete 3-hr GTT    Late Prenatal Care      Patient Active Problem List    Diagnosis Date Noted    THC use 08/08/2017     UDS pos THC 4/1/17      High risk pregnancy, antepartum 07/05/2017     BIRTH PLAN: Epidural, breastfeeding, circumcision      Need for diphtheria-tetanus-pertussis (Tdap) vaccine 07/05/2017     RECEIVED TODAY      Elevated glucose tolerance test 05/18/2017 5/16/2017 Glucose 145, needs 3 hour:  (8/2) Never got done, 1 hour repeat gluocla was ordered:      Trichimoniasis 05/16/2017     5/15 Was positive and treated, REMY sent was neg      Late prenatal care in second trimester 04/07/2017     5/15 Normal fetla survey and no other issues noted but MFM requests rpt US at 32 weeks, is scheduled      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 Linnea Crespo, who is agreeable. Plan discussed with Latisha Atkins CNM, who is agreeable. Steroids given this admission: No    Risks, benefits, alternatives and possible complications have been discussed in detail with the patient. Admission, and post admission procedures and expectations were discussed in detail. All questions were answered.     Attending's Name: Dr. Alfredo Johnson DO  Ob/Gyn Resident  8/13/2017, 8:46 PM

## 2017-08-15 LAB
CULTURE: NORMAL
CULTURE: NORMAL
Lab: NORMAL
SPECIMEN DESCRIPTION: NORMAL
STATUS: NORMAL

## 2017-08-15 PROCEDURE — 7200000001 HC VAGINAL DELIVERY

## 2017-08-15 PROCEDURE — 6370000000 HC RX 637 (ALT 250 FOR IP): Performed by: STUDENT IN AN ORGANIZED HEALTH CARE EDUCATION/TRAINING PROGRAM

## 2017-08-15 PROCEDURE — 88307 TISSUE EXAM BY PATHOLOGIST: CPT

## 2017-08-15 PROCEDURE — 1220000000 HC SEMI PRIVATE OB R&B

## 2017-08-15 PROCEDURE — 0HQ9XZZ REPAIR PERINEUM SKIN, EXTERNAL APPROACH: ICD-10-PCS | Performed by: ADVANCED PRACTICE MIDWIFE

## 2017-08-15 PROCEDURE — 59409 OBSTETRICAL CARE: CPT | Performed by: ADVANCED PRACTICE MIDWIFE

## 2017-08-15 PROCEDURE — 94762 N-INVAS EAR/PLS OXIMTRY CONT: CPT

## 2017-08-15 RX ORDER — OXYTOCIN 10 [USP'U]/ML
INJECTION, SOLUTION INTRAMUSCULAR; INTRAVENOUS
Status: DISCONTINUED
Start: 2017-08-15 | End: 2017-08-15

## 2017-08-15 RX ORDER — ONDANSETRON 4 MG/1
4 TABLET, FILM COATED ORAL EVERY 6 HOURS PRN
Status: DISCONTINUED | OUTPATIENT
Start: 2017-08-15 | End: 2017-08-17 | Stop reason: HOSPADM

## 2017-08-15 RX ORDER — OXYTOCIN 10 [USP'U]/ML
10 INJECTION, SOLUTION INTRAMUSCULAR; INTRAVENOUS ONCE
Status: DISCONTINUED | OUTPATIENT
Start: 2017-08-15 | End: 2017-08-17 | Stop reason: HOSPADM

## 2017-08-15 RX ORDER — DOCUSATE SODIUM 100 MG/1
100 CAPSULE, LIQUID FILLED ORAL 2 TIMES DAILY
Status: DISCONTINUED | OUTPATIENT
Start: 2017-08-15 | End: 2017-08-17 | Stop reason: HOSPADM

## 2017-08-15 RX ORDER — ACETAMINOPHEN 500 MG
1000 TABLET ORAL EVERY 6 HOURS PRN
Status: DISCONTINUED | OUTPATIENT
Start: 2017-08-15 | End: 2017-08-17 | Stop reason: HOSPADM

## 2017-08-15 RX ORDER — LANOLIN 100 %
OINTMENT (GRAM) TOPICAL PRN
Status: DISCONTINUED | OUTPATIENT
Start: 2017-08-15 | End: 2017-08-17 | Stop reason: HOSPADM

## 2017-08-15 RX ORDER — CALCIUM CARBONATE 200(500)MG
1000 TABLET,CHEWABLE ORAL 3 TIMES DAILY PRN
Status: DISCONTINUED | OUTPATIENT
Start: 2017-08-15 | End: 2017-08-17 | Stop reason: HOSPADM

## 2017-08-15 RX ORDER — DIPHENHYDRAMINE HCL 25 MG
50 TABLET ORAL EVERY 6 HOURS PRN
Status: DISCONTINUED | OUTPATIENT
Start: 2017-08-15 | End: 2017-08-17 | Stop reason: HOSPADM

## 2017-08-15 RX ORDER — IBUPROFEN 800 MG/1
800 TABLET ORAL EVERY 8 HOURS PRN
Status: DISCONTINUED | OUTPATIENT
Start: 2017-08-15 | End: 2017-08-17 | Stop reason: HOSPADM

## 2017-08-15 RX ADMIN — IBUPROFEN 800 MG: 800 TABLET, FILM COATED ORAL at 20:49

## 2017-08-15 RX ADMIN — BENZOCAINE AND MENTHOL: 20; .5 SPRAY TOPICAL at 20:49

## 2017-08-15 RX ADMIN — IBUPROFEN 800 MG: 800 TABLET, FILM COATED ORAL at 03:05

## 2017-08-15 RX ADMIN — DOCUSATE SODIUM 100 MG: 100 CAPSULE ORAL at 20:49

## 2017-08-15 RX ADMIN — DOCUSATE SODIUM 100 MG: 100 CAPSULE ORAL at 08:46

## 2017-08-15 ASSESSMENT — PAIN SCALES - GENERAL
PAINLEVEL_OUTOF10: 7
PAINLEVEL_OUTOF10: 0

## 2017-08-16 LAB — SURGICAL PATHOLOGY REPORT: NORMAL

## 2017-08-16 PROCEDURE — 1220000000 HC SEMI PRIVATE OB R&B

## 2017-08-16 PROCEDURE — 94762 N-INVAS EAR/PLS OXIMTRY CONT: CPT

## 2017-08-16 PROCEDURE — 6370000000 HC RX 637 (ALT 250 FOR IP): Performed by: STUDENT IN AN ORGANIZED HEALTH CARE EDUCATION/TRAINING PROGRAM

## 2017-08-16 RX ORDER — IBUPROFEN 800 MG/1
800 TABLET ORAL EVERY 8 HOURS PRN
Qty: 30 TABLET | Refills: 0 | Status: SHIPPED | OUTPATIENT
Start: 2017-08-16 | End: 2019-04-03 | Stop reason: ALTCHOICE

## 2017-08-16 RX ORDER — DOCUSATE SODIUM 100 MG/1
100 CAPSULE, LIQUID FILLED ORAL 2 TIMES DAILY PRN
Qty: 60 CAPSULE | Refills: 0 | Status: SHIPPED | OUTPATIENT
Start: 2017-08-16 | End: 2019-04-03 | Stop reason: ALTCHOICE

## 2017-08-16 RX ADMIN — IBUPROFEN 800 MG: 800 TABLET, FILM COATED ORAL at 20:58

## 2017-08-16 RX ADMIN — IBUPROFEN 800 MG: 800 TABLET, FILM COATED ORAL at 09:22

## 2017-08-16 RX ADMIN — DOCUSATE SODIUM 100 MG: 100 CAPSULE ORAL at 09:22

## 2017-08-16 ASSESSMENT — PAIN DESCRIPTION - LOCATION: LOCATION: ABDOMEN

## 2017-08-16 ASSESSMENT — PAIN SCALES - GENERAL
PAINLEVEL_OUTOF10: 2
PAINLEVEL_OUTOF10: 3
PAINLEVEL_OUTOF10: 0

## 2017-08-16 ASSESSMENT — PAIN DESCRIPTION - PAIN TYPE: TYPE: ACUTE PAIN

## 2017-08-16 ASSESSMENT — PAIN DESCRIPTION - DESCRIPTORS: DESCRIPTORS: CRAMPING

## 2017-08-17 VITALS
BODY MASS INDEX: 33.04 KG/M2 | HEIGHT: 61 IN | OXYGEN SATURATION: 94 % | TEMPERATURE: 98.2 F | SYSTOLIC BLOOD PRESSURE: 130 MMHG | DIASTOLIC BLOOD PRESSURE: 85 MMHG | HEART RATE: 97 BPM | RESPIRATION RATE: 18 BRPM | WEIGHT: 175 LBS

## 2017-08-17 PROCEDURE — S9443 LACTATION CLASS: HCPCS

## 2017-08-17 PROCEDURE — 94762 N-INVAS EAR/PLS OXIMTRY CONT: CPT

## 2017-08-17 PROCEDURE — 6370000000 HC RX 637 (ALT 250 FOR IP): Performed by: STUDENT IN AN ORGANIZED HEALTH CARE EDUCATION/TRAINING PROGRAM

## 2017-08-17 RX ADMIN — ACETAMINOPHEN 1000 MG: 500 TABLET ORAL at 03:12

## 2017-08-17 RX ADMIN — DIPHENHYDRAMINE HCL 50 MG: 25 TABLET ORAL at 03:14

## 2017-08-17 ASSESSMENT — PAIN SCALES - GENERAL
PAINLEVEL_OUTOF10: 8
PAINLEVEL_OUTOF10: 0
PAINLEVEL_OUTOF10: 0

## 2019-01-30 ENCOUNTER — HOSPITAL ENCOUNTER (EMERGENCY)
Age: 24
Discharge: HOME | End: 2019-01-30
Attending: EMERGENCY MEDICINE
Payer: COMMERCIAL

## 2019-01-30 ENCOUNTER — APPOINTMENT (OUTPATIENT)
Dept: GENERAL RADIOLOGY | Age: 24
End: 2019-01-30
Payer: COMMERCIAL

## 2019-01-30 VITALS
HEART RATE: 86 BPM | DIASTOLIC BLOOD PRESSURE: 80 MMHG | WEIGHT: 140 LBS | SYSTOLIC BLOOD PRESSURE: 110 MMHG | HEIGHT: 61 IN | OXYGEN SATURATION: 100 % | BODY MASS INDEX: 26.43 KG/M2 | TEMPERATURE: 98.2 F | RESPIRATION RATE: 27 BRPM

## 2019-01-30 DIAGNOSIS — R00.0 TACHYCARDIA: Primary | ICD-10-CM

## 2019-01-30 LAB
-: ABNORMAL
ABSOLUTE EOS #: 0.2 K/UL (ref 0–0.4)
ABSOLUTE IMMATURE GRANULOCYTE: NORMAL K/UL (ref 0–0.3)
ABSOLUTE LYMPH #: 1.9 K/UL (ref 1–4.8)
ABSOLUTE MONO #: 0.3 K/UL (ref 0.2–0.8)
AMORPHOUS: ABNORMAL
AMPHETAMINE SCREEN URINE: NEGATIVE
ANION GAP SERPL CALCULATED.3IONS-SCNC: 12 MMOL/L (ref 9–17)
BACTERIA: ABNORMAL
BARBITURATE SCREEN URINE: NEGATIVE
BASOPHILS # BLD: 1 % (ref 0–2)
BASOPHILS ABSOLUTE: 0 K/UL (ref 0–0.2)
BENZODIAZEPINE SCREEN, URINE: NEGATIVE
BILIRUBIN URINE: NEGATIVE
BUN BLDV-MCNC: 9 MG/DL (ref 6–20)
BUN/CREAT BLD: 15 (ref 9–20)
BUPRENORPHINE URINE: ABNORMAL
CALCIUM SERPL-MCNC: 9.3 MG/DL (ref 8.6–10.4)
CANNABINOID SCREEN URINE: POSITIVE
CASTS UA: ABNORMAL /LPF
CHLORIDE BLD-SCNC: 103 MMOL/L (ref 98–107)
CHP ED QC CHECK: NORMAL
CO2: 22 MMOL/L (ref 20–31)
COCAINE METABOLITE, URINE: NEGATIVE
COLOR: YELLOW
COMMENT UA: ABNORMAL
CREAT SERPL-MCNC: 0.6 MG/DL (ref 0.5–0.9)
CRYSTALS, UA: ABNORMAL /HPF
D-DIMER QUANTITATIVE: 0.34 MG/L FEU
DIFFERENTIAL TYPE: NORMAL
EKG ATRIAL RATE: 108 BPM
EKG P AXIS: 54 DEGREES
EKG P-R INTERVAL: 146 MS
EKG Q-T INTERVAL: 324 MS
EKG QRS DURATION: 78 MS
EKG QTC CALCULATION (BAZETT): 434 MS
EKG R AXIS: 63 DEGREES
EKG T AXIS: 49 DEGREES
EKG VENTRICULAR RATE: 108 BPM
EOSINOPHILS RELATIVE PERCENT: 3 % (ref 1–4)
EPITHELIAL CELLS UA: ABNORMAL /HPF (ref 0–5)
GFR AFRICAN AMERICAN: >60 ML/MIN
GFR NON-AFRICAN AMERICAN: >60 ML/MIN
GFR SERPL CREATININE-BSD FRML MDRD: NORMAL ML/MIN/{1.73_M2}
GFR SERPL CREATININE-BSD FRML MDRD: NORMAL ML/MIN/{1.73_M2}
GLUCOSE BLD-MCNC: 94 MG/DL (ref 70–99)
GLUCOSE URINE: NEGATIVE
HCT VFR BLD CALC: 39.7 % (ref 36–46)
HEMOGLOBIN: 13.1 G/DL (ref 12–16)
IMMATURE GRANULOCYTES: NORMAL %
KETONES, URINE: NEGATIVE
LEUKOCYTE ESTERASE, URINE: ABNORMAL
LYMPHOCYTES # BLD: 36 % (ref 25–45)
MAGNESIUM: 1.8 MG/DL (ref 1.6–2.6)
MCH RBC QN AUTO: 26.9 PG (ref 26–34)
MCHC RBC AUTO-ENTMCNC: 33 G/DL (ref 31–37)
MCV RBC AUTO: 81.4 FL (ref 80–100)
MDMA URINE: ABNORMAL
METHADONE SCREEN, URINE: NEGATIVE
METHAMPHETAMINE, URINE: ABNORMAL
MONOCYTES # BLD: 6 % (ref 2–8)
MUCUS: ABNORMAL
MYOGLOBIN: <21 NG/ML (ref 25–58)
NITRITE, URINE: NEGATIVE
NRBC AUTOMATED: NORMAL PER 100 WBC
OPIATES, URINE: NEGATIVE
OTHER OBSERVATIONS UA: ABNORMAL
OXYCODONE SCREEN URINE: NEGATIVE
PDW BLD-RTO: 14.2 % (ref 11.5–14.5)
PH UA: 5.5 (ref 5–8)
PHENCYCLIDINE, URINE: NEGATIVE
PLATELET # BLD: 197 K/UL (ref 130–400)
PLATELET ESTIMATE: NORMAL
PMV BLD AUTO: 8.7 FL (ref 6–12)
POTASSIUM SERPL-SCNC: 3.9 MMOL/L (ref 3.7–5.3)
PREGNANCY TEST URINE, POC: NORMAL
PROPOXYPHENE, URINE: ABNORMAL
PROTEIN UA: ABNORMAL
RBC # BLD: 4.87 M/UL (ref 4–5.2)
RBC # BLD: NORMAL 10*6/UL
RBC UA: ABNORMAL /HPF (ref 0–2)
RENAL EPITHELIAL, UA: ABNORMAL /HPF
SEG NEUTROPHILS: 54 % (ref 34–64)
SEGMENTED NEUTROPHILS ABSOLUTE COUNT: 2.9 K/UL (ref 1.8–7.7)
SODIUM BLD-SCNC: 137 MMOL/L (ref 135–144)
SPECIFIC GRAVITY UA: 1.03 (ref 1–1.03)
TEST INFORMATION: ABNORMAL
TRICHOMONAS: ABNORMAL
TRICYCLIC ANTIDEPRESSANTS, UR: ABNORMAL
TROPONIN INTERP: ABNORMAL
TROPONIN T: ABNORMAL NG/ML
TROPONIN, HIGH SENSITIVITY: <6 NG/L (ref 0–14)
TSH SERPL DL<=0.05 MIU/L-ACNC: 0.41 MIU/L (ref 0.3–5)
TURBIDITY: ABNORMAL
URINE HGB: NEGATIVE
UROBILINOGEN, URINE: NORMAL
WBC # BLD: 5.4 K/UL (ref 4.5–13.5)
WBC # BLD: NORMAL 10*3/UL
WBC UA: ABNORMAL /HPF (ref 0–5)
YEAST: ABNORMAL

## 2019-01-30 PROCEDURE — 87086 URINE CULTURE/COLONY COUNT: CPT

## 2019-01-30 PROCEDURE — 80048 BASIC METABOLIC PNL TOTAL CA: CPT

## 2019-01-30 PROCEDURE — 85379 FIBRIN DEGRADATION QUANT: CPT

## 2019-01-30 PROCEDURE — 84443 ASSAY THYROID STIM HORMONE: CPT

## 2019-01-30 PROCEDURE — 83874 ASSAY OF MYOGLOBIN: CPT

## 2019-01-30 PROCEDURE — 71046 X-RAY EXAM CHEST 2 VIEWS: CPT

## 2019-01-30 PROCEDURE — 85025 COMPLETE CBC W/AUTO DIFF WBC: CPT

## 2019-01-30 PROCEDURE — 84703 CHORIONIC GONADOTROPIN ASSAY: CPT

## 2019-01-30 PROCEDURE — 87077 CULTURE AEROBIC IDENTIFY: CPT

## 2019-01-30 PROCEDURE — 81001 URINALYSIS AUTO W/SCOPE: CPT

## 2019-01-30 PROCEDURE — 84484 ASSAY OF TROPONIN QUANT: CPT

## 2019-01-30 PROCEDURE — 83735 ASSAY OF MAGNESIUM: CPT

## 2019-01-30 PROCEDURE — 99285 EMERGENCY DEPT VISIT HI MDM: CPT

## 2019-01-30 PROCEDURE — 93005 ELECTROCARDIOGRAM TRACING: CPT

## 2019-01-30 PROCEDURE — 80307 DRUG TEST PRSMV CHEM ANLYZR: CPT

## 2019-01-30 PROCEDURE — 87186 SC STD MICRODIL/AGAR DIL: CPT

## 2019-01-31 LAB — HCG, PREGNANCY URINE (POC): NEGATIVE

## 2019-02-01 LAB
CULTURE: ABNORMAL
Lab: ABNORMAL
ORGANISM: ABNORMAL
SPECIMEN DESCRIPTION: ABNORMAL
STATUS: ABNORMAL

## 2019-02-27 ENCOUNTER — TELEPHONE (OUTPATIENT)
Dept: OBGYN | Age: 24
End: 2019-02-27

## 2019-04-03 ENCOUNTER — HOSPITAL ENCOUNTER (EMERGENCY)
Age: 24
Discharge: HOME | End: 2019-04-03
Attending: EMERGENCY MEDICINE
Payer: COMMERCIAL

## 2019-04-03 ENCOUNTER — APPOINTMENT (OUTPATIENT)
Dept: GENERAL RADIOLOGY | Age: 24
End: 2019-04-03
Payer: COMMERCIAL

## 2019-04-03 VITALS
OXYGEN SATURATION: 98 % | SYSTOLIC BLOOD PRESSURE: 129 MMHG | WEIGHT: 135 LBS | DIASTOLIC BLOOD PRESSURE: 72 MMHG | BODY MASS INDEX: 23.92 KG/M2 | TEMPERATURE: 98 F | HEART RATE: 106 BPM | HEIGHT: 63 IN | RESPIRATION RATE: 16 BRPM

## 2019-04-03 DIAGNOSIS — S29.011A MUSCLE STRAIN OF CHEST WALL, INITIAL ENCOUNTER: ICD-10-CM

## 2019-04-03 DIAGNOSIS — R07.89 CHEST WALL PAIN: Primary | ICD-10-CM

## 2019-04-03 PROCEDURE — 81003 URINALYSIS AUTO W/O SCOPE: CPT

## 2019-04-03 PROCEDURE — 99283 EMERGENCY DEPT VISIT LOW MDM: CPT

## 2019-04-03 PROCEDURE — 84703 CHORIONIC GONADOTROPIN ASSAY: CPT

## 2019-04-03 PROCEDURE — 71046 X-RAY EXAM CHEST 2 VIEWS: CPT

## 2019-04-03 PROCEDURE — 6360000002 HC RX W HCPCS: Performed by: EMERGENCY MEDICINE

## 2019-04-03 PROCEDURE — 96372 THER/PROPH/DIAG INJ SC/IM: CPT

## 2019-04-03 RX ORDER — NAPROXEN 250 MG/1
250 TABLET ORAL 2 TIMES DAILY WITH MEALS
Qty: 60 TABLET | Refills: 0 | Status: SHIPPED | OUTPATIENT
Start: 2019-04-03

## 2019-04-03 RX ORDER — KETOROLAC TROMETHAMINE 30 MG/ML
30 INJECTION, SOLUTION INTRAMUSCULAR; INTRAVENOUS ONCE
Status: COMPLETED | OUTPATIENT
Start: 2019-04-03 | End: 2019-04-03

## 2019-04-03 RX ADMIN — KETOROLAC TROMETHAMINE 30 MG: 30 INJECTION, SOLUTION INTRAMUSCULAR; INTRAVENOUS at 18:46

## 2019-04-03 ASSESSMENT — ENCOUNTER SYMPTOMS
SHORTNESS OF BREATH: 0
SORE THROAT: 0
WHEEZING: 0
COUGH: 0
ABDOMINAL PAIN: 0
EYE PAIN: 0
NAUSEA: 0
VOMITING: 0

## 2019-04-03 ASSESSMENT — PAIN DESCRIPTION - LOCATION: LOCATION: RIB CAGE

## 2019-04-03 ASSESSMENT — PAIN SCALES - GENERAL
PAINLEVEL_OUTOF10: 9
PAINLEVEL_OUTOF10: 8

## 2019-04-03 NOTE — ED NOTES
Advised by patient that around 1300 today she experienced a sudden onset of sharp pain in left lateral  chest area area. discomfort has been constant and intensity of discomfort increases upon deep inspiration. Has no recall of any specific injury . Patient is non smoker and not on any birth control meds. Yomaira Meade Upon arrival patient is alert/oriented . Respirations non labored. Voided urine obtained  And urine preg and ua done.       Baljeet Avila RN  04/03/19 5569

## 2019-04-04 LAB — HCG, PREGNANCY URINE (POC): NEGATIVE

## 2019-07-15 ENCOUNTER — HOSPITAL ENCOUNTER (EMERGENCY)
Age: 24
Discharge: HOME | End: 2019-07-15
Payer: COMMERCIAL

## 2019-07-23 ENCOUNTER — HOSPITAL ENCOUNTER (EMERGENCY)
Age: 24
Discharge: HOME | End: 2019-07-23
Attending: EMERGENCY MEDICINE
Payer: COMMERCIAL

## 2019-07-23 VITALS
BODY MASS INDEX: 28.89 KG/M2 | HEART RATE: 87 BPM | RESPIRATION RATE: 12 BRPM | OXYGEN SATURATION: 98 % | HEIGHT: 61 IN | DIASTOLIC BLOOD PRESSURE: 74 MMHG | SYSTOLIC BLOOD PRESSURE: 123 MMHG | WEIGHT: 153 LBS | TEMPERATURE: 98.4 F

## 2019-07-23 DIAGNOSIS — R51.9 ACUTE NONINTRACTABLE HEADACHE, UNSPECIFIED HEADACHE TYPE: Primary | ICD-10-CM

## 2019-07-23 PROCEDURE — 99283 EMERGENCY DEPT VISIT LOW MDM: CPT

## 2019-07-23 RX ORDER — BUTALBITAL, ACETAMINOPHEN AND CAFFEINE 300; 40; 50 MG/1; MG/1; MG/1
1 CAPSULE ORAL EVERY 6 HOURS PRN
Qty: 20 CAPSULE | Refills: 0 | Status: SHIPPED | OUTPATIENT
Start: 2019-07-23 | End: 2019-07-28

## 2019-07-23 ASSESSMENT — ENCOUNTER SYMPTOMS
TINGLING: 0
VOMITING: 0
NAUSEA: 1
BLURRED VISION: 0
PHOTOPHOBIA: 1

## 2019-07-23 ASSESSMENT — PAIN SCALES - GENERAL: PAINLEVEL_OUTOF10: 6

## 2019-07-23 NOTE — ED PROVIDER NOTES
No Known Allergies. FAMILY HISTORY     She indicated that her mother is alive. She indicated that her father is alive. She indicated that her sister is alive. SOCIAL HISTORY       Social History     Tobacco Use    Smoking status: Never Smoker    Smokeless tobacco: Never Used   Substance Use Topics    Alcohol use: No    Drug use: No     PHYSICAL EXAM     INITIAL VITALS: /74   Pulse 87   Temp 98.4 °F (36.9 °C)   Resp 12   Ht 5' 1\" (1.549 m)   Wt 153 lb (69.4 kg)   LMP 06/28/2019   SpO2 98%   BMI 28.91 kg/m²    Physical Exam   Constitutional: She appears well-developed and well-nourished. She appears distressed (mild). HENT:   Head: Atraumatic. Nose: No rhinorrhea. Mouth/Throat: Oropharynx is clear and moist. No oropharyngeal exudate. Eyes: Pupils are equal, round, and reactive to light. Conjunctivae are normal.   Neck: Neck supple. Cardiovascular: Normal rate, regular rhythm and intact distal pulses. Abdominal: Soft. There is no tenderness. There is no guarding. Musculoskeletal: Normal range of motion. Lymphadenopathy:     She has no cervical adenopathy. Neurological: She is alert. She has normal strength. No sensory deficit. Coordination normal.   No facial asymmetry. No drift. No ataxia. Normal gait. No dysarthria. Skin: Capillary refill takes 2 to 3 seconds. No rash noted. No pallor. Psychiatric: She has a normal mood and affect. Her behavior is normal.       MEDICAL DECISION MAKING:   Presentation is c/w migraine, but no h/o same. Normal neuro exam. Risk of CT may outweigh benefits. Need for outpatient MRI left to discretion of f/u physician    Declines pregnancy test. States she was tested at Urgent Care less than 1 week ago. Declines IV migraine treatment. Wants to be released with Rx for a pill, and with a work note    Rx for Joby [place] provided. Side effects discussed. Aware there are better treatments for migraines. Agrees to d/w f/u physician.     Work note

## 2020-01-10 ENCOUNTER — HOSPITAL ENCOUNTER (EMERGENCY)
Age: 25
Discharge: HOME | End: 2020-01-10
Attending: EMERGENCY MEDICINE
Payer: COMMERCIAL

## 2020-01-10 VITALS
HEART RATE: 111 BPM | SYSTOLIC BLOOD PRESSURE: 105 MMHG | BODY MASS INDEX: 29.81 KG/M2 | RESPIRATION RATE: 19 BRPM | OXYGEN SATURATION: 97 % | DIASTOLIC BLOOD PRESSURE: 84 MMHG | WEIGHT: 162 LBS | HEIGHT: 62 IN | TEMPERATURE: 99.1 F

## 2020-01-10 LAB
DIRECT EXAM: ABNORMAL
DIRECT EXAM: ABNORMAL
Lab: ABNORMAL
SPECIMEN DESCRIPTION: ABNORMAL

## 2020-01-10 PROCEDURE — 99283 EMERGENCY DEPT VISIT LOW MDM: CPT

## 2020-01-10 PROCEDURE — 87804 INFLUENZA ASSAY W/OPTIC: CPT

## 2020-01-10 RX ORDER — OSELTAMIVIR PHOSPHATE 75 MG/1
75 CAPSULE ORAL 2 TIMES DAILY
Qty: 10 CAPSULE | Refills: 0 | Status: SHIPPED | OUTPATIENT
Start: 2020-01-10 | End: 2020-01-15

## 2020-01-10 RX ORDER — DEXTROMETHORPHAN HYDROBROMIDE AND PROMETHAZINE HYDROCHLORIDE 15; 6.25 MG/5ML; MG/5ML
5 SYRUP ORAL 4 TIMES DAILY PRN
Qty: 118 ML | Refills: 0 | Status: SHIPPED | OUTPATIENT
Start: 2020-01-10 | End: 2020-01-17

## 2020-01-10 ASSESSMENT — ENCOUNTER SYMPTOMS
VOMITING: 0
RHINORRHEA: 0
WHEEZING: 0
SHORTNESS OF BREATH: 0
SINUS PRESSURE: 0
ABDOMINAL PAIN: 0
DIARRHEA: 0
SORE THROAT: 0
NAUSEA: 0
CONSTIPATION: 0
COLOR CHANGE: 0
COUGH: 0

## 2020-01-10 ASSESSMENT — PAIN DESCRIPTION - PAIN TYPE: TYPE: ACUTE PAIN

## 2020-01-10 ASSESSMENT — PAIN DESCRIPTION - LOCATION: LOCATION: GENERALIZED

## 2020-01-10 ASSESSMENT — PAIN SCALES - GENERAL: PAINLEVEL_OUTOF10: 8

## 2020-01-10 ASSESSMENT — PAIN DESCRIPTION - DESCRIPTORS: DESCRIPTORS: ACHING

## 2020-01-10 NOTE — LETTER
[facility] ED  [address] [id number]  Phone: [phone]             January 10, 2020    Patient: Ayala Swift   YOB: 1995   Date of Visit: 1/10/2020       To Whom It May Concern:    Gerardo Milan was seen and treated in our emergency department on 1/10/2020. She may return to work on January 14,2019.       Sincerely,             Signature:__________________________________

## 2020-01-10 NOTE — ED NOTES
Pt to ED with cough sinus congestion and generalized body aches for the last two days, respirations are non labored.      Carmen Salamanca, KAE  01/10/20 1292

## 2020-01-11 NOTE — ED PROVIDER NOTES
[address] ED  eMERGENCY dEPARTMENT eNCOUnter      Pt Name: Iliana Qureshi  MRN: [id number]  [place] 1995  Date of evaluation: 1/10/2020  Provider: [address] [place] [address] NP, MAVIS Merritt 8124       Chief Complaint   Patient presents with    Cough    Generalized Body Aches         HISTORY OF PRESENT ILLNESS  (Location/Symptom, Timing/Onset, Context/Setting, Quality, Duration, Modifying Factors, Severity.)   Iliana Qureshi is a 27 y.o. female who presents to the emergency department private vehicle for evaluation of a cough with generalized body aches and myalgias. Patient states that she woke up this morning he did not feel well. She had cough with generalized body aches and myalgias. She had a low-grade fever and chills. No ill contacts. Nursing Notes were reviewed. ALLERGIES     Patient has no known allergies. 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 FAMILY HISTORY     History reviewed. No pertinent family history. Family Status   Relation Name Status    Mother  Alive    Father  Alive    Sister  Alive        SOCIAL HISTORY      reports that she has never smoked. She has never used smokeless tobacco. She reports that she does not drink alcohol or use drugs. REVIEW OF SYSTEMS    (2-9 systems for level 4, 10 or more for level 5)     Review of Systems   Constitutional: Positive for chills and fever.  Negative for unexpected weight change. HENT: Negative for congestion, rhinorrhea, sinus pressure and sore throat. Respiratory: Negative for cough, shortness of breath and wheezing. Cardiovascular: Negative for chest pain and palpitations. Gastrointestinal: Negative for abdominal pain, constipation, diarrhea, nausea and vomiting. Genitourinary: Negative for dysuria and hematuria. Musculoskeletal: Negative for arthralgias and myalgias. Skin: Negative for color change and rash. Neurological: Negative for dizziness, weakness and headaches. Hematological: Negative for adenopathy. Except as noted above the remainder of the review of systems was reviewed and negative. PHYSICAL EXAM    (up to 7 for level 4, 8 or more for level 5)     ED Triage Vitals [01/10/20 1203]   BP Temp Temp Source Pulse Resp SpO2 Height Weight   105/84 99.1 °F (37.3 °C) Oral 111 19 97 % 5' 2\" (1.575 m) 162 lb (73.5 kg)       Physical Exam  Vitals signs reviewed. Constitutional:       Appearance: She is well-developed. HENT:      Head: Normocephalic and atraumatic. Eyes:      Conjunctiva/sclera: Conjunctivae normal.      Pupils: Pupils are equal, round, and reactive to light. Neck:      Musculoskeletal: Normal range of motion and neck supple. Cardiovascular:      Rate and Rhythm: Normal rate and regular rhythm. Pulmonary:      Effort: Pulmonary effort is normal. No respiratory distress. Breath sounds: Normal breath sounds. No stridor. Abdominal:      General: Bowel sounds are normal.      Palpations: Abdomen is soft. Musculoskeletal: Normal range of motion. Lymphadenopathy:      Cervical: No cervical adenopathy. Skin:     General: Skin is warm and dry. Findings: No rash. Neurological:      Mental Status: She is alert and oriented to person, place, and time.            LABS:  Labs Reviewed   RAPID INFLUENZA A/B ANTIGENS - Abnormal; Notable for the following components:       Result Value    Direct Exam POSITIVE for Influenza B Antigen (*)     All other components within normal limits       All other labs were within normal range or not returned as of this dictation. EMERGENCY DEPARTMENT COURSE and DIFFERENTIAL DIAGNOSIS/MDM:   Vitals:    Vitals:    01/10/20 1203   BP: 105/84   Pulse: 111   Resp: 19   Temp: 99.1 °F (37.3 °C)   TempSrc: Oral   SpO2: 97%   Weight: 162 lb (73.5 kg)   Height: 5' 2\" (1.575 m)     positive for influenza B and in the window for Tamiflu. Able to be discharged home. Follow-up with her doctor. FINAL IMPRESSION      1.  Influenza B          DISPOSITION/PLAN   DISPOSITION Decision To Discharge 01/10/2020 12:27:33 PM      PATIENT REFERRED TO:   [facility] ED  [address] [facility]  [phone]    If symptoms worsen      DISCHARGE MEDICATIONS:     Discharge Medication List as of 1/10/2020 12:28 PM      START taking these medications    Details   oseltamivir (TAMIFLU) 75 MG capsule Take 1 capsule by mouth 2 times daily for 5 days, Disp-10 capsule, R-0Print      promethazine-dextromethorphan (PROMETHAZINE-DM) 6.25-15 MG/5ML syrup Take 5 mLs by mouth 4 times daily as needed for Cough, Disp-118 mL, R-0Print                 (Please note that portions of this note were completed with a voice recognition program.  Efforts were made to edit the dictations but occasionally words are mis-transcribed.)    [address] [facility] NP, APRN - [address] [facility]  Certified Nurse Practitioner          MAVIS Murdock CNP  01/10/20 2012

## 2020-01-12 ENCOUNTER — HOSPITAL ENCOUNTER (EMERGENCY)
Age: 25
Discharge: HOME | End: 2020-01-12
Attending: EMERGENCY MEDICINE
Payer: COMMERCIAL

## 2020-01-12 ENCOUNTER — APPOINTMENT (OUTPATIENT)
Dept: GENERAL RADIOLOGY | Age: 25
End: 2020-01-12
Payer: COMMERCIAL

## 2020-01-12 VITALS
TEMPERATURE: 98.2 F | DIASTOLIC BLOOD PRESSURE: 83 MMHG | OXYGEN SATURATION: 97 % | WEIGHT: 162 LBS | HEART RATE: 111 BPM | RESPIRATION RATE: 16 BRPM | BODY MASS INDEX: 29.63 KG/M2 | SYSTOLIC BLOOD PRESSURE: 118 MMHG

## 2020-01-12 LAB
D-DIMER QUANTITATIVE: 0.24 MG/L FEU
EKG ATRIAL RATE: 99 BPM
EKG P AXIS: 53 DEGREES
EKG P-R INTERVAL: 138 MS
EKG Q-T INTERVAL: 346 MS
EKG QRS DURATION: 84 MS
EKG QTC CALCULATION (BAZETT): 444 MS
EKG R AXIS: 52 DEGREES
EKG T AXIS: 29 DEGREES
EKG VENTRICULAR RATE: 99 BPM
MYOGLOBIN: <21 NG/ML (ref 25–58)
TROPONIN INTERP: NORMAL
TROPONIN T: NORMAL NG/ML
TROPONIN, HIGH SENSITIVITY: <6 NG/L (ref 0–14)

## 2020-01-12 PROCEDURE — 85379 FIBRIN DEGRADATION QUANT: CPT

## 2020-01-12 PROCEDURE — 93005 ELECTROCARDIOGRAM TRACING: CPT | Performed by: EMERGENCY MEDICINE

## 2020-01-12 PROCEDURE — 6360000002 HC RX W HCPCS: Performed by: EMERGENCY MEDICINE

## 2020-01-12 PROCEDURE — 84484 ASSAY OF TROPONIN QUANT: CPT

## 2020-01-12 PROCEDURE — 83874 ASSAY OF MYOGLOBIN: CPT

## 2020-01-12 PROCEDURE — 96374 THER/PROPH/DIAG INJ IV PUSH: CPT

## 2020-01-12 PROCEDURE — 99285 EMERGENCY DEPT VISIT HI MDM: CPT

## 2020-01-12 PROCEDURE — 71046 X-RAY EXAM CHEST 2 VIEWS: CPT

## 2020-01-12 PROCEDURE — 2580000003 HC RX 258: Performed by: EMERGENCY MEDICINE

## 2020-01-12 RX ORDER — KETOROLAC TROMETHAMINE 30 MG/ML
30 INJECTION, SOLUTION INTRAMUSCULAR; INTRAVENOUS ONCE
Status: COMPLETED | OUTPATIENT
Start: 2020-01-12 | End: 2020-01-12

## 2020-01-12 RX ORDER — IBUPROFEN 800 MG/1
800 TABLET ORAL EVERY 6 HOURS PRN
Qty: 25 TABLET | Refills: 1 | Status: SHIPPED | OUTPATIENT
Start: 2020-01-12

## 2020-01-12 RX ORDER — 0.9 % SODIUM CHLORIDE 0.9 %
1000 INTRAVENOUS SOLUTION INTRAVENOUS ONCE
Status: COMPLETED | OUTPATIENT
Start: 2020-01-12 | End: 2020-01-12

## 2020-01-12 RX ADMIN — SODIUM CHLORIDE 1000 ML: 9 INJECTION, SOLUTION INTRAVENOUS at 04:51

## 2020-01-12 RX ADMIN — KETOROLAC TROMETHAMINE 30 MG: 30 INJECTION, SOLUTION INTRAMUSCULAR; INTRAVENOUS at 04:51

## 2020-01-12 ASSESSMENT — ENCOUNTER SYMPTOMS
FACIAL SWELLING: 0
BACK PAIN: 0
CHEST TIGHTNESS: 0
ABDOMINAL DISTENTION: 0
ABDOMINAL PAIN: 0
SHORTNESS OF BREATH: 0
EYE DISCHARGE: 0
EYE PAIN: 0

## 2020-01-12 ASSESSMENT — PAIN DESCRIPTION - LOCATION: LOCATION: CHEST

## 2020-01-12 ASSESSMENT — PAIN DESCRIPTION - DESCRIPTORS: DESCRIPTORS: DISCOMFORT

## 2020-01-12 ASSESSMENT — PAIN SCALES - GENERAL
PAINLEVEL_OUTOF10: 6
PAINLEVEL_OUTOF10: 6

## 2020-01-12 ASSESSMENT — PAIN DESCRIPTION - FREQUENCY: FREQUENCY: CONTINUOUS

## 2020-01-12 ASSESSMENT — PAIN DESCRIPTION - PAIN TYPE: TYPE: ACUTE PAIN

## 2020-01-12 NOTE — ED NOTES
Pt arrived to ED with c/o Chest pain that started two days ago. Pt was seen at our facility yesterday and diagnosed with Flu B. Pt states taking Tamiflu. Pt states she was unable to sleep because of the chest discomfort. Pt states movement and bending makes chest pain worse. Pt denies nausea, vomiting, fever. Pt denies Cardiac history. Respirations non labored. Skin warm and dry. Skin color appropriate to race. Pt A&Ox4.       Ran Mitchell RN  01/12/20 0510

## 2020-01-12 NOTE — ED PROVIDER NOTES
congenital deformity. CURRENT MEDICATIONS       Previous Medications    BUTALBITAL-APAP-CAFFEINE (FIORICET) -40 MG CAPS PER CAPSULE    Take 1 capsule by mouth every 6 hours as needed for Headaches    NAPROXEN (NAPROSYN) 250 MG TABLET    Take 1 tablet by mouth 2 times daily (with meals)    OSELTAMIVIR (TAMIFLU) 75 MG CAPSULE    Take 1 capsule by mouth 2 times daily for 5 days    PROMETHAZINE-DEXTROMETHORPHAN (PROMETHAZINE-DM) 6.25-15 MG/5ML SYRUP    Take 5 mLs by mouth 4 times daily as needed for Cough     ALLERGIES     has No Known Allergies. FAMILY HISTORY     She indicated that her mother is alive. She indicated that her father is alive. She indicated that her sister is alive. SOCIAL HISTORY       Social History     Tobacco Use    Smoking status: Never Smoker    Smokeless tobacco: Never Used   Substance Use Topics    Alcohol use: No    Drug use: No     PHYSICAL EXAM     INITIAL VITALS: /83   Pulse 111   Temp 98.2 °F (36.8 °C) (Oral)   Resp 16   Wt 162 lb (73.5 kg)   SpO2 97%   BMI 29.63 kg/m²    Physical Exam  Vitals signs and nursing note reviewed. Constitutional:       General: She is not in acute distress. Appearance: She is well-developed. She is not diaphoretic. HENT:      Head: Normocephalic and atraumatic. Eyes:      Pupils: Pupils are equal, round, and reactive to light. Neck:      Musculoskeletal: Normal range of motion and neck supple. Cardiovascular:      Rate and Rhythm: Regular rhythm. Tachycardia present. Pulmonary:      Effort: Pulmonary effort is normal.      Breath sounds: Normal breath sounds. Abdominal:      General: Bowel sounds are normal.      Palpations: Abdomen is soft. Musculoskeletal: Normal range of motion. Skin:     General: Skin is warm. Capillary Refill: Capillary refill takes less than 2 seconds. Neurological:      Mental Status: She is alert and oriented to person, place, and time.          MEDICAL DECISION MAKING:   The patient was seen and examined. Patient is a 82-year-old female who presented to the emergency department secondary to chest pain. Differential  diagnosis included myalgias, pericarditis, PE, STEMI. EKG obtain chest x-ray, d-dimer troponin myoglobin. EKG low voltage EKG sinus rhythm no acute ST wave depression elevations or findings consistent with STEMI. Patient will be reevaluated. Chest x-ray no infiltrate dissection pneumothorax. Troponin and myoglobin d-dimer negative. Patient likely has chest wall pain likely secondary to an influenza with myalgias. Discharged home with prescription for ibuprofen given outpatient follow-up and parameters for return to the emergency department. All patient's question's and concerns were answered prior to disposition and patient and/or family expressed understanding and agreement of treatment plan. CRITICAL CARE:              NIH STROKE SCALE:            PROCEDURES:    Procedures    DIAGNOSTIC RESULTS   EKG:All EKG's are interpreted by the Emergency Department Physician who either signs or Co-signs this chart in the absence of a cardiologist.        RADIOLOGY:All plain film, CT, MRI, and formal ultrasound images (except ED bedside ultrasound) are read by the radiologist, see reports below, unless otherwisenoted in MDM or here. XR CHEST STANDARD (2 VW)   Final Result   No acute cardiopulmonary abnormality. LABS: All lab results were reviewed by myself, and all abnormals are listed below.   Labs Reviewed   MYOGLOBIN, SERUM - Abnormal; Notable for the following components:       Result Value    Myoglobin <21 (*)     All other components within normal limits   D-DIMER, QUANTITATIVE   TROPONIN       EMERGENCY DEPARTMENTCOURSE:         Vitals:    Vitals:    01/12/20 0405 01/12/20 0409   BP: 118/83    Pulse: 111    Resp: 16    Temp: 98.2 °F (36.8 °C)    TempSrc: Oral    SpO2: 97%    Weight:  162 lb (73.5 kg)       The patient was given the following medications while in the emergency department:  Orders Placed This Encounter   Medications    ketorolac (TORADOL) injection 30 mg    0.9 % sodium chloride bolus    ibuprofen (ADVIL;MOTRIN) 800 MG tablet     Sig: Take 1 tablet by mouth every 6 hours as needed for Pain     Dispense:  25 tablet     Refill:  1     CONSULTS:  None    FINAL IMPRESSION      1. Chest wall pain          DISPOSITION/PLAN   DISPOSITION        PATIENT REFERRED TO:    Please call [phone] to establish care with a primary care physician if not able to get an appointment with your doctor. DISCHARGE MEDICATIONS:  New Prescriptions    IBUPROFEN (ADVIL;MOTRIN) 800 MG TABLET    Take 1 tablet by mouth every 6 hours as needed for Pain     Akin Costa MD  Attending Emergency Physician    This note was created with the assistance of a speech-recognition program. While intending to generate a document that actually reflects the content of the visit, no guarantees can be provided that every mistake has been identified and corrected by editing.                     Akin Costa MD  12/97/96 0634

## 2021-02-04 ENCOUNTER — OFFICE VISIT (OUTPATIENT)
Dept: PRIMARY CARE CLINIC | Age: 26
End: 2021-02-04
Payer: COMMERCIAL

## 2021-02-04 ENCOUNTER — HOSPITAL ENCOUNTER (OUTPATIENT)
Age: 26
Setting detail: SPECIMEN
Discharge: HOME | End: 2021-02-04
Payer: COMMERCIAL

## 2021-02-04 VITALS
WEIGHT: 180 LBS | HEART RATE: 101 BPM | BODY MASS INDEX: 32.92 KG/M2 | OXYGEN SATURATION: 99 % | SYSTOLIC BLOOD PRESSURE: 133 MMHG | DIASTOLIC BLOOD PRESSURE: 87 MMHG

## 2021-02-04 DIAGNOSIS — N76.0 BV (BACTERIAL VAGINOSIS): Primary | ICD-10-CM

## 2021-02-04 DIAGNOSIS — B96.89 BV (BACTERIAL VAGINOSIS): Primary | ICD-10-CM

## 2021-02-04 DIAGNOSIS — N89.8 VAGINAL ODOR: ICD-10-CM

## 2021-02-04 LAB
BILIRUBIN, POC: NEGATIVE
BLOOD URINE, POC: NEGATIVE
CLARITY, POC: ABNORMAL
COLOR, POC: YELLOW
GLUCOSE URINE, POC: NEGATIVE
KETONES, POC: NEGATIVE
LEUKOCYTE EST, POC: ABNORMAL
NITRITE, POC: NEGATIVE
PH, POC: 5
PROTEIN, POC: ABNORMAL
SPECIFIC GRAVITY, POC: 1.03
UROBILINOGEN, POC: NEGATIVE

## 2021-02-04 PROCEDURE — 81003 URINALYSIS AUTO W/O SCOPE: CPT | Performed by: INTERNAL MEDICINE

## 2021-02-04 PROCEDURE — 1036F TOBACCO NON-USER: CPT | Performed by: INTERNAL MEDICINE

## 2021-02-04 PROCEDURE — G8427 DOCREV CUR MEDS BY ELIG CLIN: HCPCS | Performed by: INTERNAL MEDICINE

## 2021-02-04 PROCEDURE — G8484 FLU IMMUNIZE NO ADMIN: HCPCS | Performed by: INTERNAL MEDICINE

## 2021-02-04 PROCEDURE — 99213 OFFICE O/P EST LOW 20 MIN: CPT | Performed by: INTERNAL MEDICINE

## 2021-02-04 PROCEDURE — G8417 CALC BMI ABV UP PARAM F/U: HCPCS | Performed by: INTERNAL MEDICINE

## 2021-02-04 RX ORDER — METRONIDAZOLE 500 MG/1
500 TABLET ORAL 3 TIMES DAILY
Qty: 21 TABLET | Refills: 0 | Status: SHIPPED | OUTPATIENT
Start: 2021-02-04 | End: 2021-02-11

## 2021-02-04 ASSESSMENT — PATIENT HEALTH QUESTIONNAIRE - PHQ9
SUM OF ALL RESPONSES TO PHQ QUESTIONS 1-9: 0
SUM OF ALL RESPONSES TO PHQ9 QUESTIONS 1 & 2: 0
SUM OF ALL RESPONSES TO PHQ QUESTIONS 1-9: 0
1. LITTLE INTEREST OR PLEASURE IN DOING THINGS: 0

## 2021-02-04 NOTE — PROGRESS NOTES
[facility] [address] [facility]  [address]  [address] [facility] [address]  Dept: [phone]  Dept Fax: [phone]    Maribel Andrade is a 23 y.o. female who presents to the urgent care today for her medicalconditions/complaints as noted below. Maribel Andrade is c/o of Vaginal Odor (fishy smell )      HPI:     \"my pH balance is off. \"    Vaginal Discharge  The patient's primary symptoms include vaginal discharge. This is a recurrent problem. The current episode started in the past 7 days. The problem occurs constantly. The problem has been unchanged. The vaginal discharge was malodorous. Nothing aggravates the symptoms. She has tried nothing for the symptoms. The treatment provided no relief. Past Medical History:   Diagnosis Date    Headache         Current Outpatient Medications   Medication Sig Dispense Refill    metroNIDAZOLE (FLAGYL) 500 MG tablet Take 1 tablet by mouth 3 times daily for 7 days 21 tablet 0    ibuprofen (ADVIL;MOTRIN) 800 MG tablet Take 1 tablet by mouth every 6 hours as needed for Pain (Patient not taking: Reported on 2/4/2021) 25 tablet 1    butalbital-APAP-caffeine (FIORICET) -40 MG CAPS per capsule Take 1 capsule by mouth every 6 hours as needed for Headaches 20 capsule 0    naproxen (NAPROSYN) 250 MG tablet Take 1 tablet by mouth 2 times daily (with meals) (Patient not taking: Reported on 2/4/2021) 60 tablet 0     No current facility-administered medications for this visit.       No Known Allergies    Health Maintenance   Topic Date Due    Hepatitis C screen  1995    Varicella vaccine (1 of 2 - 2-dose childhood series) 09/30/1998    A1C test (Diabetic or Prediabetic)  09/30/2007    HPV vaccine (1 - 2-dose series) 09/30/2008    Chlamydia screen  04/01/2018    Cervical cancer screen  09/30/2018    Flu vaccine (1) 09/01/2020    DTaP/Tdap/Td vaccine (2 - Td) 07/05/2027    HIV screen  Completed    Hepatitis A vaccine  Aged Out  Hepatitis B vaccine  Aged Out    Hib vaccine  Aged Out    Meningococcal (ACWY) vaccine  Aged Out    Pneumococcal 0-64 years Vaccine  Aged Out       Subjective:      Review of Systems   Genitourinary: Positive for vaginal discharge. All other systems reviewed and are negative. Objective:     Physical Exam  Vitals signs reviewed. Constitutional:       Appearance: Normal appearance. HENT:      Head: Normocephalic and atraumatic. Skin:     General: Skin is warm and dry. Neurological:      General: No focal deficit present. Mental Status: She is alert and oriented to person, place, and time. Psychiatric:         Mood and Affect: Mood normal.         Behavior: Behavior normal.       /87 (Site: Left Upper Arm, Position: Sitting, Cuff Size: Medium Adult)   Pulse 101   Wt 180 lb (81.6 kg)   SpO2 99%   Breastfeeding No   BMI 32.92 kg/m²       Assessment:       Diagnosis Orders   1. BV (bacterial vaginosis)  Urinalysis Reflex to Culture    metroNIDAZOLE (FLAGYL) 500 MG tablet   2. Vaginal odor  Vaginitis DNA Probe    POCT Urinalysis No Micro (Auto)       Plan:      No follow-ups on file. Orders Placed This Encounter   Medications    metroNIDAZOLE (FLAGYL) 500 MG tablet     Sig: Take 1 tablet by mouth 3 times daily for 7 days     Dispense:  21 tablet     Refill:  0     Orders Placed This Encounter   Procedures    Vaginitis DNA Probe     Standing Status:   Future     Standing Expiration Date:   2/4/2022    Urinalysis Reflex to Culture     Standing Status:   Future     Standing Expiration Date:   2/4/2022     Order Specific Question:   SPECIFY(EX-CATH,MIDSTREAM,CYSTO,ETC)? Answer:   ms cc    POCT Urinalysis No Micro (Auto)            Patient given educational materials - see patient instructions. Discussed use, benefit, and side effects of prescribed medications. All patientquestions answered. Pt voiced understanding. Electronically signed by Edna Morales MD on 2/4/2021at 2:58 PM

## 2021-02-05 DIAGNOSIS — N76.0 BV (BACTERIAL VAGINOSIS): ICD-10-CM

## 2021-02-05 DIAGNOSIS — B96.89 BV (BACTERIAL VAGINOSIS): ICD-10-CM

## 2021-02-05 DIAGNOSIS — N89.8 VAGINAL ODOR: ICD-10-CM

## 2021-02-05 LAB
-: ABNORMAL
AMORPHOUS: ABNORMAL
BACTERIA: ABNORMAL
BILIRUBIN URINE: NEGATIVE
CASTS UA: ABNORMAL /LPF (ref 0–2)
COLOR: YELLOW
COMMENT UA: ABNORMAL
CRYSTALS, UA: ABNORMAL /HPF
DIRECT EXAM: ABNORMAL
EPITHELIAL CELLS UA: ABNORMAL /HPF (ref 0–5)
GLUCOSE URINE: NEGATIVE
KETONES, URINE: NEGATIVE
LEUKOCYTE ESTERASE, URINE: ABNORMAL
Lab: ABNORMAL
MUCUS: ABNORMAL
NITRITE, URINE: NEGATIVE
OTHER OBSERVATIONS UA: ABNORMAL
PH UA: 6 (ref 5–8)
PROTEIN UA: NEGATIVE
RBC UA: ABNORMAL /HPF (ref 0–2)
RENAL EPITHELIAL, UA: ABNORMAL /HPF
SPECIFIC GRAVITY UA: 1.02 (ref 1–1.03)
SPECIMEN DESCRIPTION: ABNORMAL
TRICHOMONAS: ABNORMAL
TURBIDITY: ABNORMAL
URINE HGB: NEGATIVE
UROBILINOGEN, URINE: NORMAL
WBC UA: ABNORMAL /HPF (ref 0–5)
YEAST: ABNORMAL

## 2023-06-10 ENCOUNTER — APPOINTMENT (OUTPATIENT)
Dept: CT IMAGING | Age: 28
End: 2023-06-10
Payer: COMMERCIAL

## 2023-06-10 ENCOUNTER — HOSPITAL ENCOUNTER (EMERGENCY)
Age: 28
Discharge: HOME | End: 2023-06-10
Attending: EMERGENCY MEDICINE
Payer: COMMERCIAL

## 2023-06-10 VITALS
RESPIRATION RATE: 16 BRPM | HEART RATE: 111 BPM | OXYGEN SATURATION: 98 % | TEMPERATURE: 98.1 F | SYSTOLIC BLOOD PRESSURE: 117 MMHG | DIASTOLIC BLOOD PRESSURE: 79 MMHG
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
## 2023-06-10 LAB
ANION GAP SERPL CALCULATED.3IONS-SCNC: 13 MMOL/L (ref 9–17)
BUN SERPL-MCNC: 5 MG/DL (ref 6–20)
CALCIUM SERPL-MCNC: 9 MG/DL (ref 8.6–10.4)
CHLORIDE SERPL-SCNC: 105 MMOL/L (ref 98–107)
CO2 SERPL-SCNC: 23 MMOL/L (ref 20–31)
CREAT SERPL-MCNC: 0.52 MG/DL (ref 0.5–0.9)
GFR SERPL CREATININE-BSD FRML MDRD: >60 ML/MIN/1.73M2
GLUCOSE SERPL-MCNC: 104 MG/DL (ref 70–99)
HCG SERPL QL: NEGATIVE
POTASSIUM SERPL-SCNC: 3.6 MMOL/L (ref 3.7–5.3)
SODIUM SERPL-SCNC: 141 MMOL/L (ref 135–144)

## 2023-06-10 PROCEDURE — 6360000004 HC RX CONTRAST MEDICATION: Performed by: EMERGENCY MEDICINE

## 2023-06-10 PROCEDURE — 84703 CHORIONIC GONADOTROPIN ASSAY: CPT

## 2023-06-10 PROCEDURE — 72125 CT NECK SPINE W/O DYE: CPT

## 2023-06-10 PROCEDURE — 71260 CT THORAX DX C+: CPT

## 2023-06-10 PROCEDURE — 80048 BASIC METABOLIC PNL TOTAL CA: CPT

## 2023-06-10 PROCEDURE — 70450 CT HEAD/BRAIN W/O DYE: CPT

## 2023-06-10 PROCEDURE — 3209999900 CT LUMBAR SPINE TRAUMA RECONSTRUCTION

## 2023-06-10 PROCEDURE — 3209999900 CT THORACIC SPINE TRAUMA RECONSTRUCTION

## 2023-06-10 RX ADMIN — IOPAMIDOL 75 ML: 755 INJECTION, SOLUTION INTRAVENOUS at 07:38

## 2023-06-10 ASSESSMENT — PAIN SCALES - GENERAL: PAINLEVEL_OUTOF10: 10

## 2023-06-10 ASSESSMENT — ENCOUNTER SYMPTOMS
VOMITING: 0
NAUSEA: 0
ABDOMINAL PAIN: 0
COUGH: 0
BACK PAIN: 1
SHORTNESS OF BREATH: 0

## 2023-06-10 ASSESSMENT — PAIN DESCRIPTION - LOCATION: LOCATION: NOSE

## 2023-06-10 NOTE — ED NOTES
The following labs were labeled with appropriate pt sticker and tubed to lab:     [] Blue     [] Lavender   [] on ice  [x] Green/yellow  [] Green/black [] on ice  [] Roise Clack  [] on ice  [] Yellow  [] Red  [] Type/ Screen  [] ABG  [] VBG    [] COVID-19 swab    [] Rapid  [] PCR  [] Flu swab  [] Peds Viral Panel     [] Urine Sample  [] Fecal Sample  [] Pelvic Cultures  [] Blood Cultures  [] X 2  [] STREP Cultures      Diomedes Perez RN  06/10/23 1114
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
## 2023-06-10 NOTE — DISCHARGE INSTRUCTIONS
Please call Monday to schedule follow-up appoint with plastic surgery soon as possible. Take Tylenol and ibuprofen as needed for pain: You can take 800 mg ibuprofen every 8 hours. You can take 1000 mg Tylenol every 8 hours. Please alternate these medications for maximal effect. For example if you take ibuprofen at noon, take Tylenol 4 PM, then repeat ibuprofen 8 PM and so on. Return to the ED with worsening pain, numbness, weakness, or other new/concerning symptoms.
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
## 2023-06-19 ENCOUNTER — TELEPHONE (OUTPATIENT)
Dept: INTERNAL MEDICINE | Age: 28
End: 2023-06-19
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
## 2023-06-22 ENCOUNTER — HOSPITAL ENCOUNTER (EMERGENCY)
Age: 28
Discharge: HOME | End: 2023-06-22
Attending: EMERGENCY MEDICINE
Payer: COMMERCIAL

## 2023-06-22 VITALS
SYSTOLIC BLOOD PRESSURE: 142 MMHG | WEIGHT: 202.6 LBS | HEIGHT: 64 IN | DIASTOLIC BLOOD PRESSURE: 92 MMHG | HEART RATE: 91 BPM | OXYGEN SATURATION: 98 % | RESPIRATION RATE: 18 BRPM | BODY MASS INDEX: 34.59 KG/M2 | TEMPERATURE: 98.7 F

## 2023-06-22 DIAGNOSIS — R10.32 LEFT LOWER QUADRANT ABDOMINAL PAIN: Primary | ICD-10-CM

## 2023-06-22 LAB
BACTERIA URNS QL MICRO: ABNORMAL
BILIRUB UR QL STRIP: NEGATIVE
CANDIDA SPECIES, DNA PROBE: NEGATIVE
CLARITY UR: ABNORMAL
COLOR UR: YELLOW
EPI CELLS #/AREA URNS HPF: ABNORMAL /HPF (ref 0–5)
GARDNERELLA VAGINALIS, DNA PROBE: NEGATIVE
GLUCOSE UR STRIP-MCNC: NEGATIVE MG/DL
HCG(URINE) PREGNANCY TEST: NEGATIVE
HGB UR QL STRIP.AUTO: NEGATIVE
KETONES UR STRIP-MCNC: ABNORMAL MG/DL
LEUKOCYTE ESTERASE UR QL STRIP: ABNORMAL
NITRITE UR QL STRIP: NEGATIVE
PH UR STRIP: 7 [PH] (ref 5–8)
PROT UR STRIP-MCNC: ABNORMAL MG/DL
RBC #/AREA URNS HPF: ABNORMAL /HPF (ref 0–4)
SOURCE: NORMAL
SP GR UR STRIP: 1.03 (ref 1–1.03)
TRICHOMONAS VAGINALIS DNA: NEGATIVE
UROBILINOGEN UR STRIP-ACNC: NORMAL
WBC #/AREA URNS HPF: ABNORMAL /HPF (ref 0–5)

## 2023-06-22 PROCEDURE — 99283 EMERGENCY DEPT VISIT LOW MDM: CPT

## 2023-06-22 PROCEDURE — 6370000000 HC RX 637 (ALT 250 FOR IP): Performed by: STUDENT IN AN ORGANIZED HEALTH CARE EDUCATION/TRAINING PROGRAM

## 2023-06-22 PROCEDURE — 87660 TRICHOMONAS VAGIN DIR PROBE: CPT

## 2023-06-22 PROCEDURE — 87510 GARDNER VAG DNA DIR PROBE: CPT

## 2023-06-22 PROCEDURE — 81001 URINALYSIS AUTO W/SCOPE: CPT

## 2023-06-22 PROCEDURE — 87086 URINE CULTURE/COLONY COUNT: CPT

## 2023-06-22 PROCEDURE — 81025 URINE PREGNANCY TEST: CPT

## 2023-06-22 PROCEDURE — 87591 N.GONORRHOEAE DNA AMP PROB: CPT

## 2023-06-22 PROCEDURE — 87480 CANDIDA DNA DIR PROBE: CPT

## 2023-06-22 PROCEDURE — 87491 CHLMYD TRACH DNA AMP PROBE: CPT

## 2023-06-22 RX ORDER — DICYCLOMINE HYDROCHLORIDE 10 MG/1
20 CAPSULE ORAL ONCE
Status: COMPLETED | OUTPATIENT
Start: 2023-06-22 | End: 2023-06-22

## 2023-06-22 RX ORDER — ONDANSETRON 4 MG/1
4 TABLET, ORALLY DISINTEGRATING ORAL ONCE
Status: COMPLETED | OUTPATIENT
Start: 2023-06-22 | End: 2023-06-22

## 2023-06-22 RX ORDER — LIDOCAINE HYDROCHLORIDE 20 MG/ML
15 SOLUTION OROPHARYNGEAL ONCE
Status: DISCONTINUED | OUTPATIENT
Start: 2023-06-22 | End: 2023-06-22 | Stop reason: HOSPADM

## 2023-06-22 RX ORDER — MAGNESIUM HYDROXIDE/ALUMINUM HYDROXICE/SIMETHICONE 120; 1200; 1200 MG/30ML; MG/30ML; MG/30ML
30 SUSPENSION ORAL ONCE
Status: COMPLETED | OUTPATIENT
Start: 2023-06-22 | End: 2023-06-22

## 2023-06-22 RX ADMIN — DICYCLOMINE HYDROCHLORIDE 20 MG: 10 CAPSULE ORAL at 15:07

## 2023-06-22 RX ADMIN — ONDANSETRON 4 MG: 4 TABLET, ORALLY DISINTEGRATING ORAL at 17:56

## 2023-06-22 RX ADMIN — ALUMINUM HYDROXIDE, MAGNESIUM HYDROXIDE, AND SIMETHICONE 30 ML: 200; 200; 20 SUSPENSION ORAL at 17:56

## 2023-06-22 ASSESSMENT — ENCOUNTER SYMPTOMS
ABDOMINAL PAIN: 1
NAUSEA: 1
SHORTNESS OF BREATH: 0
COLOR CHANGE: 0

## 2023-06-22 ASSESSMENT — PAIN - FUNCTIONAL ASSESSMENT: PAIN_FUNCTIONAL_ASSESSMENT: 0-10

## 2023-06-22 ASSESSMENT — PAIN SCALES - GENERAL
PAINLEVEL_OUTOF10: 9

## 2023-06-22 NOTE — ED NOTES
The following labs were labeled with appropriate pt sticker and tubed to lab:     [] Blue     [] Lavender   [] on ice  [] Green/yellow  [] Green/black [] on ice  [] Reche Devries  [] on ice  [] Yellow  [] Red  [] Type/ Screen  [] ABG  [] VBG    [] COVID-19 swab    [] Rapid  [] PCR  [] Flu swab  [] Peds Viral Panel     [x] Urine Sample  [] Fecal Sample  [x] Pelvic Cultures  [] Blood Cultures  [] X 2  [] STREP Cultures       Isai Rivera RN  06/22/23 0469
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
## 2023-06-22 NOTE — ED NOTES
Pt provided with vaginal swabs, educated on how to swab self  Pt provided with specimen cup for urine sample  Ambulated to restroom with steady/even gait      Rachel Ware RN  06/22/23 9401

## 2023-06-23 LAB
C TRACH DNA SPEC QL PROBE+SIG AMP: NEGATIVE
MICROORGANISM SPEC CULT: NORMAL
N GONORRHOEA DNA SPEC QL PROBE+SIG AMP: NEGATIVE
SPECIMEN DESCRIPTION: NORMAL
SPECIMEN DESCRIPTION: NORMAL

## 2023-07-19 ENCOUNTER — APPOINTMENT (OUTPATIENT)
Dept: GENERAL RADIOLOGY | Age: 28
End: 2023-07-19
Payer: COMMERCIAL

## 2023-07-19 ENCOUNTER — HOSPITAL ENCOUNTER (EMERGENCY)
Age: 28
Discharge: HOME | End: 2023-07-19
Attending: EMERGENCY MEDICINE
Payer: COMMERCIAL

## 2023-07-19 VITALS
HEIGHT: 64 IN | RESPIRATION RATE: 14 BRPM | TEMPERATURE: 98.8 F | BODY MASS INDEX: 34.15 KG/M2 | OXYGEN SATURATION: 98 % | HEART RATE: 74 BPM | DIASTOLIC BLOOD PRESSURE: 92 MMHG | SYSTOLIC BLOOD PRESSURE: 142 MMHG | WEIGHT: 200 LBS

## 2023-07-19 DIAGNOSIS — R07.9 CHEST PAIN, UNSPECIFIED TYPE: Primary | ICD-10-CM

## 2023-07-19 PROCEDURE — 99284 EMERGENCY DEPT VISIT MOD MDM: CPT

## 2023-07-19 PROCEDURE — 71046 X-RAY EXAM CHEST 2 VIEWS: CPT

## 2023-07-19 PROCEDURE — 93005 ELECTROCARDIOGRAM TRACING: CPT | Performed by: EMERGENCY MEDICINE

## 2023-07-19 ASSESSMENT — ENCOUNTER SYMPTOMS: SHORTNESS OF BREATH: 1

## 2023-07-19 ASSESSMENT — PAIN SCALES - GENERAL: PAINLEVEL_OUTOF10: 9

## 2023-07-19 ASSESSMENT — PAIN - FUNCTIONAL ASSESSMENT: PAIN_FUNCTIONAL_ASSESSMENT: 0-10

## 2023-07-19 NOTE — ED PROVIDER NOTES
such as CT, Ultrasound and MRI are read by theradiologist. Plain radiographic images are visualized and preliminarily interpreted by the emergency physician with the below findings:    Interpretation per the Radiologist below, if available at the time of this note:    XR CHEST (2 VW)   Final Result   Normal chest radiographs             ED BEDSIDE ULTRASOUND:   Performed by ED Physician - none    LABS:  Labs Reviewed - No data to display    All other labs were within normal range or not returned as of this dictation. EMERGENCYDEPARTMENT COURSE and DIFFERENTIAL DIAGNOSIS/MDM:   Vitals:    Vitals:    07/19/23 0501 07/19/23 0521 07/19/23 0531 07/19/23 0541   BP: 122/79  (!) 142/92    Pulse: 65 73 76 74   Resp: 13 17 16 14   Temp:       TempSrc:       SpO2: 98% 98% 97% 98%   Weight:       Height:         24 y.o. F with reports of sudden CP and SOB that wakes her from sleep and has happened the past two night. At this time she is asymptomatic. Her symptoms are consistent with sleep apnea which she has been told she might have but has never been tested. No risk factors for ACS. No risk factors for PE. Will obtain CXR and EKG and continue to monitor. I do not feel that any blood work is indicated at this time. Treatment and Disposition     Patient repeat assessment:  No recurrence of symptoms. CXR and EKG unremarkable. Will recommend outpatient sleep study for further evaluation. Disposition discussion with patient/family: Patient aware and agrees with disposition plan. Case discussed with consulting clinician:  none     MIPS:  n/a     Social determinants of health impacting treatment or disposition:  None    Code Status Discussion:  Not discussed    CONSULTS:  None    PROCEDURES:  None indicated    FINAL IMPRESSION     1.  Chest pain, unspecified type          DISPOSITION/PLAN   DISPOSITION Decision To Discharge 07/19/2023 05:35:50 AM    PATIENT REFERRED TO:   Your primary care doctor    Schedule an

## 2023-07-19 NOTE — ED NOTES
Pt presents to the er c/o mid sternal non radiating chest pain that awoke her from sleep pt is with respirations even and unlabored warm and dry      Jose Polanco RN  07/19/23 9992

## 2023-07-20 LAB
EKG ATRIAL RATE: 79 BPM
EKG P AXIS: 28 DEGREES
EKG P-R INTERVAL: 152 MS
EKG Q-T INTERVAL: 382 MS
EKG QRS DURATION: 82 MS
EKG QTC CALCULATION (BAZETT): 438 MS
EKG R AXIS: 12 DEGREES
EKG T AXIS: 14 DEGREES
EKG VENTRICULAR RATE: 79 BPM

## 2023-07-24 ENCOUNTER — OFFICE VISIT (OUTPATIENT)
Dept: FAMILY MEDICINE CLINIC | Age: 28
End: 2023-07-24
Payer: COMMERCIAL

## 2023-07-24 VITALS
OXYGEN SATURATION: 97 % | HEART RATE: 100 BPM | RESPIRATION RATE: 20 BRPM | SYSTOLIC BLOOD PRESSURE: 124 MMHG | WEIGHT: 195.2 LBS | DIASTOLIC BLOOD PRESSURE: 80 MMHG | BODY MASS INDEX: 33.32 KG/M2 | HEIGHT: 64 IN

## 2023-07-24 DIAGNOSIS — Z76.89 ENCOUNTER TO ESTABLISH CARE: Primary | ICD-10-CM

## 2023-07-24 DIAGNOSIS — Z13.29 SCREENING FOR THYROID DISORDER: ICD-10-CM

## 2023-07-24 DIAGNOSIS — Z13.1 SCREENING FOR DIABETES MELLITUS: ICD-10-CM

## 2023-07-24 DIAGNOSIS — J34.2 DEVIATED SEPTUM: ICD-10-CM

## 2023-07-24 DIAGNOSIS — Z13.220 SCREENING FOR HYPERLIPIDEMIA: ICD-10-CM

## 2023-07-24 DIAGNOSIS — Z11.59 ENCOUNTER FOR HEPATITIS C SCREENING TEST FOR LOW RISK PATIENT: ICD-10-CM

## 2023-07-24 DIAGNOSIS — R06.83 SNORING: ICD-10-CM

## 2023-07-24 DIAGNOSIS — Z13.0 SCREENING FOR DEFICIENCY ANEMIA: ICD-10-CM

## 2023-07-24 PROCEDURE — 1036F TOBACCO NON-USER: CPT | Performed by: NURSE PRACTITIONER

## 2023-07-24 PROCEDURE — 99204 OFFICE O/P NEW MOD 45 MIN: CPT | Performed by: NURSE PRACTITIONER

## 2023-07-24 PROCEDURE — G8427 DOCREV CUR MEDS BY ELIG CLIN: HCPCS | Performed by: NURSE PRACTITIONER

## 2023-07-24 PROCEDURE — G8417 CALC BMI ABV UP PARAM F/U: HCPCS | Performed by: NURSE PRACTITIONER

## 2023-07-24 SDOH — ECONOMIC STABILITY: HOUSING INSECURITY
IN THE LAST 12 MONTHS, WAS THERE A TIME WHEN YOU DID NOT HAVE A STEADY PLACE TO SLEEP OR SLEPT IN A SHELTER (INCLUDING NOW)?: NO

## 2023-07-24 SDOH — ECONOMIC STABILITY: INCOME INSECURITY: HOW HARD IS IT FOR YOU TO PAY FOR THE VERY BASICS LIKE FOOD, HOUSING, MEDICAL CARE, AND HEATING?: NOT HARD AT ALL

## 2023-07-24 SDOH — ECONOMIC STABILITY: FOOD INSECURITY: WITHIN THE PAST 12 MONTHS, THE FOOD YOU BOUGHT JUST DIDN'T LAST AND YOU DIDN'T HAVE MONEY TO GET MORE.: NEVER TRUE

## 2023-07-24 SDOH — ECONOMIC STABILITY: FOOD INSECURITY: WITHIN THE PAST 12 MONTHS, YOU WORRIED THAT YOUR FOOD WOULD RUN OUT BEFORE YOU GOT MONEY TO BUY MORE.: NEVER TRUE

## 2023-07-24 ASSESSMENT — ENCOUNTER SYMPTOMS
BACK PAIN: 0
CHEST TIGHTNESS: 0
CONSTIPATION: 0
SINUS PRESSURE: 0
NAUSEA: 0
EYE PAIN: 0
SINUS PAIN: 0
COLOR CHANGE: 0
SHORTNESS OF BREATH: 0
ABDOMINAL PAIN: 0
VOMITING: 0
DIARRHEA: 0

## 2023-07-24 ASSESSMENT — PATIENT HEALTH QUESTIONNAIRE - PHQ9
SUM OF ALL RESPONSES TO PHQ QUESTIONS 1-9: 0
SUM OF ALL RESPONSES TO PHQ9 QUESTIONS 1 & 2: 0
SUM OF ALL RESPONSES TO PHQ QUESTIONS 1-9: 0
2. FEELING DOWN, DEPRESSED OR HOPELESS: 0
1. LITTLE INTEREST OR PLEASURE IN DOING THINGS: 0

## 2023-07-24 NOTE — PROGRESS NOTES
Rebecca Queen (:  1995) is a 24 y.o. female,New patient, here for evaluation of the following chief complaint(s):  New Patient, Health Maintenance (Depression screen completed. /SDOH completed. Magda Porsche ordered. /), Sleep Problem (States she needs to be tested for sleep apnea. /), and Snoring (/States she snores-unsure if she needs her tonsils out or not. /)         ASSESSMENT/PLAN:  1. Encounter to establish care  Comments:  Rx for annual labs, follow up pending results. 2. Encounter for hepatitis C screening test for low risk patient  -     Hepatitis C Antibody; Future  3. Screening for thyroid disorder  -     TSH With Reflex Ft4; Future  4. Screening for diabetes mellitus  -     Comprehensive Metabolic Panel; Future  -     Hemoglobin A1C; Future  5. Screening for deficiency anemia  -     CBC with Auto Differential; Future  6. Screening for hyperlipidemia  -     Lipid Panel; Future  7. Snoring  Comments: Follow up pending results. Orders:  -     Baseline Diagnostic Sleep Study; Future  8. Deviated septum  -     AFL - Edna Chan MD, Otolaryngology, [facility]      Return in about 1 year (around 2024) for physical.         Subjective   SUBJECTIVE/OBJECTIVE:  Presents as new patient to establish care  Works at [employer] full time    3son, 10years old   Single    GYN: Believes she updated PAP last year, unsure where  No hx of abnormal paps  Periods regular  No concerns for STD today     Reports she snores, seems to be getting worse   Wakes up gasping for air most nights  Admits this has now been causing her chest pain when waking up  Has a hard time breathing out of her nose, been this way since she was a child   Does not get good sleep at night, wakes up exhausted   Takes naps daily, otherwise she has a hard time getting through day. Knows this is interfering with sleep cycle.     Would like to update labs          Review of Systems   Constitutional:  Negative for activity change,

## 2023-08-03 ENCOUNTER — TELEPHONE (OUTPATIENT)
Dept: FAMILY MEDICINE CLINIC | Age: 28
End: 2023-08-03

## 2023-08-10 NOTE — TELEPHONE ENCOUNTER
MAILBOX IS FULL. Will mail letter. Pt needs an appointment to discuss BC. Referral was refaxed to [facility] sleep center [phone].

## 2024-07-06 ENCOUNTER — HOSPITAL ENCOUNTER (EMERGENCY)
Age: 29
Discharge: OTHER INSTITUTION | End: 2024-07-06
Attending: EMERGENCY MEDICINE
Payer: COMMERCIAL

## 2024-07-06 ENCOUNTER — HOSPITAL ENCOUNTER (INPATIENT)
Age: 29
LOS: 3 days | Discharge: HOME | End: 2024-07-09
Attending: OBSTETRICS & GYNECOLOGY | Admitting: OBSTETRICS & GYNECOLOGY
Payer: COMMERCIAL

## 2024-07-06 VITALS
WEIGHT: 195 LBS | SYSTOLIC BLOOD PRESSURE: 144 MMHG | HEART RATE: 89 BPM | DIASTOLIC BLOOD PRESSURE: 85 MMHG | BODY MASS INDEX: 33.47 KG/M2 | RESPIRATION RATE: 18 BRPM | OXYGEN SATURATION: 99 % | TEMPERATURE: 97.4 F

## 2024-07-06 PROBLEM — Z3A.38 38 WEEKS GESTATION OF PREGNANCY: Status: ACTIVE | Noted: 2024-07-06

## 2024-07-06 PROBLEM — O09.30 NO PRENATAL CARE IN CURRENT PREGNANCY: Status: ACTIVE | Noted: 2024-07-06

## 2024-07-06 LAB
ABO + RH BLD: NORMAL
ALBUMIN SERPL-MCNC: 3.4 G/DL (ref 3.5–5.2)
ALP SERPL-CCNC: 148 U/L (ref 35–104)
ALT SERPL-CCNC: 11 U/L (ref 5–33)
AMPHET UR QL SCN: NEGATIVE
ANION GAP SERPL CALCULATED.3IONS-SCNC: 15 MMOL/L (ref 9–17)
ARM BAND NUMBER: NORMAL
AST SERPL-CCNC: 15 U/L
BARBITURATES UR QL SCN: NEGATIVE
BASOPHILS # BLD: 0 K/UL (ref 0–0.2)
BASOPHILS NFR BLD: 0 % (ref 0–2)
BENZODIAZ UR QL: NEGATIVE
BILIRUB SERPL-MCNC: 0.3 MG/DL (ref 0.3–1.2)
BLOOD BANK SAMPLE EXPIRATION: NORMAL
BLOOD GROUP ANTIBODIES SERPL: NEGATIVE
BUN SERPL-MCNC: 6 MG/DL (ref 6–20)
CALCIUM SERPL-MCNC: 8.5 MG/DL (ref 8.6–10.4)
CANNABINOIDS UR QL SCN: NEGATIVE
CHLORIDE SERPL-SCNC: 104 MMOL/L (ref 98–107)
CO2 SERPL-SCNC: 19 MMOL/L (ref 20–31)
COCAINE UR QL SCN: NEGATIVE
CREAT SERPL-MCNC: <0.4 MG/DL (ref 0.5–0.9)
CREAT UR-MCNC: 31.6 MG/DL (ref 28–217)
EOSINOPHIL # BLD: 0.06 K/UL (ref 0–0.4)
EOSINOPHILS RELATIVE PERCENT: 1 % (ref 0–4)
ERYTHROCYTE [DISTWIDTH] IN BLOOD BY AUTOMATED COUNT: 16.3 % (ref 11.5–14.9)
FENTANYL UR QL: NEGATIVE
GFR, ESTIMATED: ABNORMAL ML/MIN/1.73M2
GLUCOSE SERPL-MCNC: 106 MG/DL (ref 70–99)
HBV SURFACE AG SERPL QL IA: NONREACTIVE
HCT VFR BLD AUTO: 34.4 % (ref 36–46)
HCV AB SERPL QL IA: NONREACTIVE
HGB BLD-MCNC: 11 G/DL (ref 12–16)
HIV 1+2 AB+HIV1 P24 AG SERPL QL IA: NONREACTIVE
INR PPP: 1.2
LYMPHOCYTES NFR BLD: 1.74 K/UL (ref 1–4.8)
LYMPHOCYTES RELATIVE PERCENT: 30 % (ref 24–44)
MCH RBC QN AUTO: 24.6 PG (ref 26–34)
MCHC RBC AUTO-ENTMCNC: 32 G/DL (ref 31–37)
MCV RBC AUTO: 77 FL (ref 80–100)
METHADONE UR QL: NEGATIVE
MONOCYTES NFR BLD: 0.41 K/UL (ref 0.1–1.3)
MONOCYTES NFR BLD: 7 % (ref 1–7)
MORPHOLOGY: ABNORMAL
MORPHOLOGY: ABNORMAL
NEUTROPHILS NFR BLD: 62 % (ref 36–66)
NEUTS SEG NFR BLD: 3.59 K/UL (ref 1.3–9.1)
OPIATES UR QL SCN: NEGATIVE
OXYCODONE UR QL SCN: NEGATIVE
PARTIAL THROMBOPLASTIN TIME: 30.9 SEC (ref 24–36)
PCP UR QL SCN: NEGATIVE
PLATELET # BLD AUTO: 143 K/UL (ref 150–450)
PMV BLD AUTO: 8.7 FL (ref 6–12)
POTASSIUM SERPL-SCNC: 3.7 MMOL/L (ref 3.7–5.3)
PROT SERPL-MCNC: 6.7 G/DL (ref 6.4–8.3)
PROTHROMBIN TIME: 15.1 SEC (ref 11.8–14.6)
RBC # BLD AUTO: 4.47 M/UL (ref 4–5.2)
SODIUM SERPL-SCNC: 138 MMOL/L (ref 135–144)
T PALLIDUM AB SER QL IA: NONREACTIVE
TEST INFORMATION: NORMAL
TOTAL PROTEIN, URINE: 338 MG/DL
URINE TOTAL PROTEIN CREATININE RATIO: 10.7
WBC OTHER # BLD: 5.8 K/UL (ref 3.5–11)

## 2024-07-06 PROCEDURE — 87389 HIV-1 AG W/HIV-1&-2 AB AG IA: CPT

## 2024-07-06 PROCEDURE — 96374 THER/PROPH/DIAG INJ IV PUSH: CPT

## 2024-07-06 PROCEDURE — 1220000000 HC SEMI PRIVATE OB R&B

## 2024-07-06 PROCEDURE — 87340 HEPATITIS B SURFACE AG IA: CPT

## 2024-07-06 PROCEDURE — 85610 PROTHROMBIN TIME: CPT

## 2024-07-06 PROCEDURE — 59409 OBSTETRICAL CARE: CPT | Performed by: OBSTETRICS & GYNECOLOGY

## 2024-07-06 PROCEDURE — 86901 BLOOD TYPING SEROLOGIC RH(D): CPT

## 2024-07-06 PROCEDURE — 36415 COLL VENOUS BLD VENIPUNCTURE: CPT

## 2024-07-06 PROCEDURE — 85730 THROMBOPLASTIN TIME PARTIAL: CPT

## 2024-07-06 PROCEDURE — 86780 TREPONEMA PALLIDUM: CPT

## 2024-07-06 PROCEDURE — 84156 ASSAY OF PROTEIN URINE: CPT

## 2024-07-06 PROCEDURE — 96375 TX/PRO/DX INJ NEW DRUG ADDON: CPT

## 2024-07-06 PROCEDURE — 99285 EMERGENCY DEPT VISIT HI MDM: CPT

## 2024-07-06 PROCEDURE — 6360000002 HC RX W HCPCS

## 2024-07-06 PROCEDURE — 6370000000 HC RX 637 (ALT 250 FOR IP)

## 2024-07-06 PROCEDURE — 86900 BLOOD TYPING SEROLOGIC ABO: CPT

## 2024-07-06 PROCEDURE — 2580000003 HC RX 258

## 2024-07-06 PROCEDURE — 85025 COMPLETE CBC W/AUTO DIFF WBC: CPT

## 2024-07-06 PROCEDURE — 88307 TISSUE EXAM BY PATHOLOGIST: CPT

## 2024-07-06 PROCEDURE — 82570 ASSAY OF URINE CREATININE: CPT

## 2024-07-06 PROCEDURE — 86850 RBC ANTIBODY SCREEN: CPT

## 2024-07-06 PROCEDURE — 86803 HEPATITIS C AB TEST: CPT

## 2024-07-06 PROCEDURE — 80053 COMPREHEN METABOLIC PANEL: CPT

## 2024-07-06 PROCEDURE — 80307 DRUG TEST PRSMV CHEM ANLYZR: CPT

## 2024-07-06 RX ORDER — IBUPROFEN 600 MG/1
600 TABLET ORAL EVERY 6 HOURS
Status: DISCONTINUED | OUTPATIENT
Start: 2024-07-06 | End: 2024-07-09 | Stop reason: HOSPADM

## 2024-07-06 RX ORDER — ACETAMINOPHEN 500 MG
1000 TABLET ORAL EVERY 8 HOURS SCHEDULED
Status: DISCONTINUED | OUTPATIENT
Start: 2024-07-06 | End: 2024-07-06

## 2024-07-06 RX ORDER — SODIUM CHLORIDE 0.9 % (FLUSH) 0.9 %
5-40 SYRINGE (ML) INJECTION PRN
Status: DISCONTINUED | OUTPATIENT
Start: 2024-07-06 | End: 2024-07-09 | Stop reason: HOSPADM

## 2024-07-06 RX ORDER — LANOLIN 72 %
OINTMENT (GRAM) TOPICAL PRN
Status: CANCELLED | OUTPATIENT
Start: 2024-07-06

## 2024-07-06 RX ORDER — CALCIUM GLUCONATE 94 MG/ML
1000 INJECTION, SOLUTION INTRAVENOUS PRN
Status: DISCONTINUED | OUTPATIENT
Start: 2024-07-06 | End: 2024-07-06

## 2024-07-06 RX ORDER — SODIUM CHLORIDE, SODIUM LACTATE, POTASSIUM CHLORIDE, CALCIUM CHLORIDE 600; 310; 30; 20 MG/100ML; MG/100ML; MG/100ML; MG/100ML
INJECTION, SOLUTION INTRAVENOUS CONTINUOUS
Status: DISCONTINUED | OUTPATIENT
Start: 2024-07-06 | End: 2024-07-06

## 2024-07-06 RX ORDER — SODIUM CHLORIDE 0.9 % (FLUSH) 0.9 %
5-40 SYRINGE (ML) INJECTION PRN
Status: CANCELLED | OUTPATIENT
Start: 2024-07-06

## 2024-07-06 RX ORDER — SODIUM CHLORIDE 0.9 % (FLUSH) 0.9 %
5-40 SYRINGE (ML) INJECTION EVERY 12 HOURS SCHEDULED
Status: CANCELLED | OUTPATIENT
Start: 2024-07-06

## 2024-07-06 RX ORDER — BISACODYL 10 MG
10 SUPPOSITORY, RECTAL RECTAL DAILY PRN
Status: CANCELLED | OUTPATIENT
Start: 2024-07-06

## 2024-07-06 RX ORDER — CALCIUM GLUCONATE 94 MG/ML
1000 INJECTION, SOLUTION INTRAVENOUS PRN
Status: DISCONTINUED | OUTPATIENT
Start: 2024-07-06 | End: 2024-07-09 | Stop reason: HOSPADM

## 2024-07-06 RX ORDER — ONDANSETRON 2 MG/ML
4 INJECTION INTRAMUSCULAR; INTRAVENOUS EVERY 6 HOURS PRN
Status: CANCELLED | OUTPATIENT
Start: 2024-07-06

## 2024-07-06 RX ORDER — SENNA AND DOCUSATE SODIUM 50; 8.6 MG/1; MG/1
1 TABLET, FILM COATED ORAL DAILY PRN
Status: CANCELLED | OUTPATIENT
Start: 2024-07-06

## 2024-07-06 RX ORDER — SODIUM CHLORIDE 9 MG/ML
INJECTION, SOLUTION INTRAVENOUS PRN
Status: DISCONTINUED | OUTPATIENT
Start: 2024-07-06 | End: 2024-07-06

## 2024-07-06 RX ORDER — NIFEDIPINE 30 MG/1
30 TABLET, EXTENDED RELEASE ORAL DAILY
Status: DISCONTINUED | OUTPATIENT
Start: 2024-07-06 | End: 2024-07-08

## 2024-07-06 RX ORDER — ONDANSETRON 2 MG/ML
4 INJECTION INTRAMUSCULAR; INTRAVENOUS EVERY 6 HOURS PRN
Status: DISCONTINUED | OUTPATIENT
Start: 2024-07-06 | End: 2024-07-09 | Stop reason: HOSPADM

## 2024-07-06 RX ORDER — SODIUM CHLORIDE 0.9 % (FLUSH) 0.9 %
10 SYRINGE (ML) INJECTION PRN
Status: DISCONTINUED | OUTPATIENT
Start: 2024-07-06 | End: 2024-07-09 | Stop reason: HOSPADM

## 2024-07-06 RX ORDER — SIMETHICONE 80 MG
80 TABLET,CHEWABLE ORAL EVERY 6 HOURS PRN
Status: CANCELLED | OUTPATIENT
Start: 2024-07-06

## 2024-07-06 RX ORDER — SODIUM CHLORIDE 9 MG/ML
INJECTION, SOLUTION INTRAVENOUS PRN
Status: CANCELLED | OUTPATIENT
Start: 2024-07-06

## 2024-07-06 RX ORDER — SODIUM CHLORIDE 0.9 % (FLUSH) 0.9 %
10 SYRINGE (ML) INJECTION EVERY 12 HOURS SCHEDULED
Status: DISCONTINUED | OUTPATIENT
Start: 2024-07-06 | End: 2024-07-09 | Stop reason: HOSPADM

## 2024-07-06 RX ORDER — ACETAMINOPHEN 500 MG
1000 TABLET ORAL 3 TIMES DAILY
Qty: 120 TABLET | Refills: 1 | Status: SHIPPED | OUTPATIENT
Start: 2024-07-06

## 2024-07-06 RX ORDER — ACETAMINOPHEN 500 MG
1000 TABLET ORAL EVERY 6 HOURS
Status: CANCELLED | OUTPATIENT
Start: 2024-07-06

## 2024-07-06 RX ORDER — ONDANSETRON 4 MG/1
4 TABLET, ORALLY DISINTEGRATING ORAL EVERY 6 HOURS PRN
Status: CANCELLED | OUTPATIENT
Start: 2024-07-06

## 2024-07-06 RX ORDER — LABETALOL HYDROCHLORIDE 5 MG/ML
INJECTION, SOLUTION INTRAVENOUS
Status: COMPLETED
Start: 2024-07-06 | End: 2024-07-06

## 2024-07-06 RX ORDER — MAGNESIUM SULFATE HEPTAHYDRATE 40 MG/ML
4000 INJECTION, SOLUTION INTRAVENOUS ONCE
Status: COMPLETED | OUTPATIENT
Start: 2024-07-06 | End: 2024-07-06

## 2024-07-06 RX ORDER — SODIUM CHLORIDE, SODIUM LACTATE, POTASSIUM CHLORIDE, CALCIUM CHLORIDE 600; 310; 30; 20 MG/100ML; MG/100ML; MG/100ML; MG/100ML
INJECTION, SOLUTION INTRAVENOUS CONTINUOUS
Status: DISCONTINUED | OUTPATIENT
Start: 2024-07-06 | End: 2024-07-07

## 2024-07-06 RX ORDER — SODIUM CHLORIDE 9 MG/ML
INJECTION, SOLUTION INTRAVENOUS PRN
Status: DISCONTINUED | OUTPATIENT
Start: 2024-07-06 | End: 2024-07-09 | Stop reason: HOSPADM

## 2024-07-06 RX ORDER — LABETALOL HYDROCHLORIDE 5 MG/ML
40 INJECTION, SOLUTION INTRAVENOUS ONCE
Status: COMPLETED | OUTPATIENT
Start: 2024-07-06 | End: 2024-07-06

## 2024-07-06 RX ORDER — POLYETHYLENE GLYCOL 3350 17 G/17G
17 POWDER, FOR SOLUTION ORAL DAILY PRN
Status: CANCELLED | OUTPATIENT
Start: 2024-07-06

## 2024-07-06 RX ORDER — IBUPROFEN 600 MG/1
600 TABLET ORAL EVERY 6 HOURS PRN
Qty: 120 TABLET | Refills: 1 | Status: SHIPPED | OUTPATIENT
Start: 2024-07-06 | End: 2024-09-04

## 2024-07-06 RX ORDER — ACETAMINOPHEN 500 MG
1000 TABLET ORAL EVERY 6 HOURS
Status: DISCONTINUED | OUTPATIENT
Start: 2024-07-06 | End: 2024-07-09 | Stop reason: HOSPADM

## 2024-07-06 RX ORDER — ONDANSETRON 4 MG/1
4 TABLET, ORALLY DISINTEGRATING ORAL EVERY 6 HOURS PRN
Status: DISCONTINUED | OUTPATIENT
Start: 2024-07-06 | End: 2024-07-09 | Stop reason: HOSPADM

## 2024-07-06 RX ORDER — SODIUM CHLORIDE 0.9 % (FLUSH) 0.9 %
5-40 SYRINGE (ML) INJECTION EVERY 12 HOURS SCHEDULED
Status: DISCONTINUED | OUTPATIENT
Start: 2024-07-06 | End: 2024-07-06

## 2024-07-06 RX ORDER — SENNA AND DOCUSATE SODIUM 50; 8.6 MG/1; MG/1
2 TABLET, FILM COATED ORAL 2 TIMES DAILY
Status: DISCONTINUED | OUTPATIENT
Start: 2024-07-06 | End: 2024-07-09 | Stop reason: HOSPADM

## 2024-07-06 RX ORDER — SODIUM CHLORIDE 0.9 % (FLUSH) 0.9 %
5-40 SYRINGE (ML) INJECTION EVERY 12 HOURS SCHEDULED
Status: DISCONTINUED | OUTPATIENT
Start: 2024-07-06 | End: 2024-07-09 | Stop reason: HOSPADM

## 2024-07-06 RX ORDER — LANOLIN 72 %
OINTMENT (GRAM) TOPICAL PRN
Status: DISCONTINUED | OUTPATIENT
Start: 2024-07-06 | End: 2024-07-09 | Stop reason: HOSPADM

## 2024-07-06 RX ADMIN — SODIUM CHLORIDE, POTASSIUM CHLORIDE, SODIUM LACTATE AND CALCIUM CHLORIDE: 600; 310; 30; 20 INJECTION, SOLUTION INTRAVENOUS at 15:46

## 2024-07-06 RX ADMIN — IBUPROFEN 600 MG: 600 TABLET, FILM COATED ORAL at 16:25

## 2024-07-06 RX ADMIN — LABETALOL HYDROCHLORIDE 40 MG: 5 INJECTION, SOLUTION INTRAVENOUS at 12:47

## 2024-07-06 RX ADMIN — ACETAMINOPHEN 1000 MG: 500 TABLET ORAL at 20:20

## 2024-07-06 RX ADMIN — NIFEDIPINE 30 MG: 30 TABLET, FILM COATED, EXTENDED RELEASE ORAL at 16:46

## 2024-07-06 RX ADMIN — Medication 30 UNITS: at 13:04

## 2024-07-06 RX ADMIN — SENNOSIDES AND DOCUSATE SODIUM 2 TABLET: 50; 8.6 TABLET ORAL at 20:21

## 2024-07-06 RX ADMIN — MAGNESIUM SULFATE HEPTAHYDRATE 2000 MG/HR: 40 INJECTION, SOLUTION INTRAVENOUS at 16:30

## 2024-07-06 RX ADMIN — MAGNESIUM SULFATE HEPTAHYDRATE 4000 MG: 40 INJECTION, SOLUTION INTRAVENOUS at 15:57

## 2024-07-06 ASSESSMENT — PAIN DESCRIPTION - LOCATION
LOCATION: ABDOMEN
LOCATION: ABDOMEN

## 2024-07-06 ASSESSMENT — PAIN DESCRIPTION - DESCRIPTORS
DESCRIPTORS: ACHING;CRAMPING
DESCRIPTORS: CRAMPING

## 2024-07-06 ASSESSMENT — PAIN SCALES - GENERAL
PAINLEVEL_OUTOF10: 5
PAINLEVEL_OUTOF10: 7

## 2024-07-06 ASSESSMENT — PAIN DESCRIPTION - ORIENTATION: ORIENTATION: LOWER

## 2024-07-06 ASSESSMENT — ENCOUNTER SYMPTOMS
ABDOMINAL PAIN: 1
SHORTNESS OF BREATH: 0

## 2024-07-06 NOTE — DISCHARGE SUMMARY
Obstetric Discharge Summary  [facility]    Patient Name: Valarie Lae  Patient : 1995  Primary Care Physician: Nona Gasca APRN - NP  Admit Date: 2024    Principal Diagnosis:  at [facility], admitted for postpartum care     Her pregnancy has been complicated by:   Patient Active Problem List   Diagnosis    Hx of Trichimoniasis (REMY neg)    Need for diphtheria-tetanus-pertussis (Tdap) vaccine     8/15/17 M Apg 8/9 Wt 7#6     @ [facility] 24 M/F Apg 9/9 Wt 6#12    Limited/No PNC (G2)    PreE w/ SF (G2)       Infection Present?: No  Hospital Acquired: No    Surgical Operations & Procedures:  Analgesia: None  Delivery Type: Spontaneous Vaginal Delivery: See Labor and Delivery Summary   Laceration(s): Absent    Consultations: none    Pertinent Findings & Procedures:   Valarie Lea is a 28 y.o. female  after  at [facility] with No/limited prenatal care admitted for postpartum care; received motrin, tylenol. She had severe range pressures and met criteria for Preeclampsia with Severe Features (Bps). She was given Labetalol 40mg IV at [facility]. PreE labs wnl, P/C 10.7. Started on Mag sulfate infusion and Procardia 30 XL.    She delivered by spontaneous vaginal a Live Born infant on 24.   Patient estimates that she is 38w6d at time of delivery.    Information for the patient's :  Luiz Lea [[id number]]   male   Birth Weight: 3.062 kg (6 lb 12 oz)     Apgars: 9 at 1 minute and 9 at 5 minutes.     Postpartum course:   POD#1: Patient received 24 hours of magnesium sulfate infusion. Repeat PreE labs wnl. Hgb 10.4  POD#2: BP intermittently elevated. Due to hx of dysmenorrhea, depo ordered.   POD#3: BP remained intermittently elevated on Procardia 30 XL qd. Received additional one time dose of Procardia 30 XL. Increased to Procardia 60mg qd.    Course of patient: complicated by limited prenatal care, precipitous delivery     Discharge to:  She was counseled on various alternate recommendations to decrease the exposure to secondary smoke to her children.

## 2024-07-06 NOTE — PROGRESS NOTES
Resident Interval Magnesium Sulfate Note    Valarie Lea is a 28 y.o. female  PPD# 1 s/p  at [facility] on 24  The patient is resting comfortably. She denies headache, visual changes, and abdominal pain in the right upper quadrant. She denies any shortness of breath or chest pain. She denies change in her extremities, regarding swelling.    Continuous Medications:    sodium chloride      lactated ringers IV soln 75 mL/hr at 24 1546    magnesium sulfate 2,000 mg/hr (24 1630)       Vitals:    Vitals:    24 1715 24 1745 24 1815 24 1846   BP: 133/88 (!) 134/96 131/82 125/80   Pulse: 89 91 90 91   Resp: 20 18 18 18   Temp: 98 °F (36.7 °C)      TempSrc: Oral      SpO2: 99% 97% 99% 96%         Physical Exam:  Chest: clear to auscultation bilaterally  Heart: RRR no murmur  Abdomen: soft, nontender, nondistended  Extremities: DTR normal Bilateral patellar reflexes, extremities Right: 2/4   Left: 2/4  Clonus: absent    Urine Output: voided, not measured    Labs:  Last Magnesium Level:   Lab Results   Component Value Date/Time    MG 1.8 2019 06:11 PM       BMP:    Recent Labs     24  1240      K 3.7      CO2 19*   BUN 6   CREATININE <0.4*   GLUCOSE 106*       ASSESSMENT/PLAN  Valarie Lea is a 28 y.o. female  PPD# 1 s/p  at [facility] on 24   - Continue Magnesium Sulfate Treatment 2g/hr, off @ 1557 on 24   - Mag levels q6hrs per provider if symptomatic   - BPs normotensive   - Patient denies any s/s PreE   - UOP not measured, strict Is & Os encouraged   - PreE labs wnl, P/C pending   - Currently controlled on Procardia 30 XL   - Last IV anti-hypertensive at [facility] at time of delivery   - Continue to monitor closely    Ruthy Rockwell MD  Ob/Gyn Resident  2024, 7:47 PM

## 2024-07-06 NOTE — L&D DELIVERY SUMMARY NOTE
Vaginal Delivery Note  Department of Obstetrics and Gynecology  [facility]       Patient: Valarie Lea   : 1995  MRN: [id number]   Date of delivery: 24     Pre-operative Diagnosis: Valarie Lea  at Unknown  Spontaneous labor   Limited/no prenatal care    Post-operative Diagnosis:  Living  infant(s) and Male    Delivering Obstetrician & Assistant(s): Francisco Oseguera DO PGY 2    Infant Information:   This patient has no babies on file.  This patient has no babies on file.    Anesthesia:  none    Application and Delivery:    She was known to be GBS unknown and did not receive antibiotic prophylaxis.     Called immediate to [facility] from [facility].  Pt in second stage of labor.  Upon arrive noted that she had received 20mg of Labetalol IV for severe range BP.  Labs were pending.  She was unstable for transport.  Upon evaluation the amniotic sac was noted to be intact.  Completely dilated +2 out of 3 with intact amniotic membranes.  AROM amniotic sac revealing moderate meconium amniotic fluid.  Patient pushed for approximately 2 minutes and delivery 1257.      After pushing with contractions the fetal head delivered Cephalic, right occiput anterior over an intact perineum, nuchal cord was not present.  The anterior shoulder with compound left hand, then posterior shoulder delivered easily and atraumatically followed by the rest of the infant. Spontaneous cry was heard and good tone appreciated.  Nose and mouth suctioned with bulb suction, infant was stimulated and dried. Cord was clamped and cut after 60 seconds of delay and infant was placed in warmer, and attended by RN and respiratory for evaluation. The delivery of the placenta was spontaneous and appeared intact, whole, and meconium stained. Pitocin was started. The vagina was swept of all clots and debris. The perineum and vagina were evaluated and no laceration was found.  Mother

## 2024-07-06 NOTE — H&P
OBSTETRICAL HISTORY AND PHYSICAL  [facility]    Date: 2024       Time: 4:10 PM   Patient Name: Valarie Lea     Patient : 1995  Room/Bed: 0706/0706-01    Admission Date/Time: 2024  3:15 PM      CC: postpartum care, s/p  @ [facility]    HPI: Valarie Lea is a 28 y.o.  admitted after a spontaneous vaginal delivery at [facility].  Patient states that she started to have some cramping this morning and once contractions were 4 minutes apart, she drove herself to the [facility].  She delivered via spontaneous vaginal delivery at 1257 of a baby that appears full-term.  OB/GYN attending physician Dr. Gonzalez and this resident present for delivery at [facility].   and delivery of placenta were uncomplicated.  Patient did have a severe range blood pressure that was treated with 40 mg of IV labetalol.  Patient and  were transferred to [facility] for postpartum care.    Patient appears to have limited to no prenatal care in this pregnancy and reports that she had a dating and viability ultrasound early in pregnancy at [facility].  Patient estimates that at the time of delivery today she was 38 weeks and 6 days.  She reports that she was last seen for prenatal care a month ago and follows with a family medicine doctor, Dr. Prather, at [facility].  Patient denies pertinent medical history and surgical history.  She denies a history of hypertension, elevated blood sugar/diabetes, thyroid issues, asthma, anxiety/depression, migraines/headaches.  She reports she does not take any daily medicines.  Denies pertinent family history.  She reports she does not drink any alcohol, smoke any cigarettes, use any illicit drugs.    She denies HA, vision changes, SOB, chest pain, lightheadedness, dizziness, nausea, vomiting, dysuria, and hematuria.     DATING:  LMP: Patient's last menstrual period was 07/10/2023.  Estimated Date

## 2024-07-06 NOTE — SIGNIFICANT EVENT
OB Attending    I was called by [facility] at 12:36pm to transfer this patient as she was actively laboring. Quick signout received by Dr. Lerma received and patient was completely dilated with the urge to push and would not be a candidate for transfer. I accepted this patient via [employer] and hung up the phone with [facility] to speak directly with Dr. Lerma.     Dr. Gonzalez was present at [facility] and he immediately left [place] to go to [place] to assist with delivery. I joined Dr. Lerma and his team via [employer] to assist until Dr. Gonzalez arrived. BP noted to be severe range at 12:48 and 40mg IV labetalol pushed. Fetal assessment performed via ultrasound and heart tones were 120, fetus in cephalic position. Valarie began pushing with contractions. Dr. Gonzalez arrived at bedside at 12:55, AROM performed with [facility]. Baby delivered at 12:57.     Patient was transferred to [facility] with her baby. Upon arrival, she had severe range blood pressures. Magnesium initiated for seizure prophylaxis. Will push IV antihypertensives as needed.

## 2024-07-06 NOTE — FLOWSHEET NOTE
Pt arrived from [facility] ED after spontaneous vaginal delivery. Pt denies any chest pain shortness of breath headache or blurred vision  . Vitals stabl

## 2024-07-06 NOTE — FLOWSHEET NOTE
Patient transferred to postpartum from Labor and delivery via WC. Patient oriented to room. Mag check with transferring RN. Call light within reach, will continue to monitor.

## 2024-07-06 NOTE — ED NOTES
Report given to KAE palumbo from OB.   Report method by phone   The following was reviewed with receiving RN:   Current vital signs:  BP (!) 144/85   Pulse 89   Temp 97.4 °F (36.3 °C) (Axillary)   Resp 18   Wt 88.5 kg (195 lb)   SpO2 99%   BMI 33.47 kg/m²                      Any medication or safety alerts were reviewed. Any pending diagnostics and notifications were also reviewed, as well as any safety concerns or issues, abnormal labs, abnormal imaging, and abnormal assessment findings. Questions were answered.

## 2024-07-06 NOTE — ED PROVIDER NOTES
[facility]  Emergency Department Encounter  Emergency Medicine Physician     Pt Name: Valarie Lea  MRN: [id number]  Birthdate 1995  Date of evaluation: 7/6/24  PCP:  Nona Gasca APRN - NP    CHIEF COMPLAINT       Chief Complaint   Patient presents with    Laboring       HISTORY OF PRESENT ILLNESS  (Location/Symptom, Timing/Onset, Context/Setting, Quality, Duration, Modifying Factors, Severity.)    Valarie Lea is a 28 y.o. female who presents with active labor.  Patient states that he is approximately 9 months pregnant and feels that she is having contractions.  She states he follows with an OB/GYN but she is not sure entirely which 1.  Has been having prenatal care.  Endorsing lower abdominal pain.  She states that her amniotic fluid has not ruptured yet.        PAST MEDICAL / SURGICAL / SOCIAL / FAMILY HISTORY    has a past medical history of Headache.     has a past surgical history that includes Hand Osteoplasty (Right).    Social History     Socioeconomic History    Marital status: Single     Spouse name: Not on file    Number of children: Not on file    Years of education: Not on file    Highest education level: Not on file   Occupational History    Not on file   Tobacco Use    Smoking status: Never    Smokeless tobacco: Never   Vaping Use    Vaping Use: Some days    Substances: Nicotine    Devices: Disposable   Substance and Sexual Activity    Alcohol use: No    Drug use: No    Sexual activity: Yes     Partners: Male     Birth control/protection: Condom   Other Topics Concern    Not on file   Social History Narrative    Not on file     Social Determinants of Health     Financial Resource Strain: Low Risk  (7/24/2023)    Overall Financial Resource Strain (CARDIA)     Difficulty of Paying Living Expenses: Not hard at all   Food Insecurity: Not on file (7/24/2023)   Transportation Needs: Unknown (7/24/2023)    PRAPARE - Transportation     Lack of Transportation (Medical): Not on  distress. The structures studied were the uterus and its contents.    FINDINGS:  Fetus was visualized  Gross fetal movement was visualized.  Fetal heart tones measured at 160 bpm.  The study was technically adequate.    IMAGES:  Electronically uploaded to the PACS system          CONSULTS:  None    CRITICAL CARE:  There was a high probability of clinically significant/life threatening deterioration in this patient's condition which required my urgent intervention.  Total critical care time was 90 minutes.  This excludes any time for separately reportable procedures.     FINAL IMPRESSION     1. Liveborn infant, of pandya pregnancy, born in hospital by vaginal delivery          DISPOSITION / PLAN   DISPOSITION Decision To Transfer 07/06/2024 01:18:50 PM        PATIENT REFERRED TO:  No follow-up provider specified.    DISCHARGE MEDICATIONS:  New Prescriptions    No medications on file       Cody Lerma DO  Emergency Medicine Attending    (Please note that portions of this note were completed with a voice recognition program.  Efforts were made to edit the dictations but occasionally words are mis-transcribed.)          Cody Lerma DO  07/06/24 [id number]

## 2024-07-06 NOTE — PROGRESS NOTES
POST PARTUM DAY # 1    Valarie Lea is a 28 y.o. female  This patient was seen & examined today. PPD# 1 s/p  at [place] on 24 with PreE with SF (Bps)    Her pregnancy was complicated by:   Patient Active Problem List   Diagnosis    Late PNC    Thrombocytopenia (likely gestational)    Hx of Trichimoniasis (REMY neg)    Elev 1-hr GTT, no 3-hr GTT    High risk pregnancy, antepartum    Need for diphtheria-tetanus-pertussis (Tdap) vaccine    THC use    Newly diagnosed gHTN    Noncompliance    Decreased fetal movement     8/15/ M Apg 8/9 Wt 7#6    38 weeks gestation of pregnancy     @ [facility] 24 M/F Apg 9/9 Wt 6#12    Limited/No PNC (G2)       Today she is doing well without any chief complaint. Her lochia is light. She denies chest pain, shortness of breath, headache, and blurred vision. She is bottle feeding and she denies any breast tenderness. She is ambulating well. Her voiding pattern is normal. I reviewed signs and symptoms of post partum depression with the patient, she currently denies any of these symptoms. She is tolerating solids.     She denies headache and visual changes. She denies any shortness of breath or chest pain. She denies change in her extremities, regarding swelling.    Vital Signs:  Vitals:    24 1715 24 1745 24 1815 24 1846   BP: 133/88 (!) 134/96 131/82 125/80   Pulse: 89 91 90 91   Resp: 20 18 18 18   Temp: 98 °F (36.7 °C)      TempSrc: Oral      SpO2: 99% 97% 99% 96%         Physical Exam:  General:  no apparent distress, alert, and cooperative  Neurologic:  alert, oriented, normal speech, no focal findings or movement disorder noted  Lungs:  no increased work of breathing, no conversational dyspnea  Heart:  regular rate   Abdomen: abdomen soft, non-distended, appropriately tender  Fundus: non-tender, normal size, firm, below umbilicus  Extremities:  no calf tenderness, non edematous, DTR normal Bilateral patellar extremities Right:    Left:

## 2024-07-07 PROBLEM — Z91.199 NONCOMPLIANCE: Status: RESOLVED | Noted: 2017-08-13 | Resolved: 2024-07-07

## 2024-07-07 PROBLEM — D69.1: Status: RESOLVED | Noted: 2017-04-07 | Resolved: 2024-07-07

## 2024-07-07 PROBLEM — O99.112: Status: RESOLVED | Noted: 2017-04-07 | Resolved: 2024-07-07

## 2024-07-07 PROBLEM — O09.32 LATE PRENATAL CARE IN SECOND TRIMESTER: Status: RESOLVED | Noted: 2017-04-07 | Resolved: 2024-07-07

## 2024-07-07 PROBLEM — O13.1 GESTATIONAL (PREGNANCY-INDUCED) HYPERTENSION WITHOUT SIGNIFICANT PROTEINURIA, FIRST TRIMESTER: Status: RESOLVED | Noted: 2017-08-13 | Resolved: 2024-07-07

## 2024-07-07 PROBLEM — O09.90 HIGH RISK PREGNANCY, ANTEPARTUM: Chronic | Status: RESOLVED | Noted: 2017-07-05 | Resolved: 2024-07-07

## 2024-07-07 PROBLEM — F12.10 TETRAHYDROCANNABINOL (THC) USE DISORDER, MILD, ABUSE: Status: RESOLVED | Noted: 2017-08-08 | Resolved: 2024-07-07

## 2024-07-07 PROBLEM — Z3A.38 38 WEEKS GESTATION OF PREGNANCY: Status: RESOLVED | Noted: 2024-07-06 | Resolved: 2024-07-07

## 2024-07-07 PROBLEM — O36.8190 DECREASED FETAL MOVEMENT: Status: RESOLVED | Noted: 2017-08-14 | Resolved: 2024-07-07

## 2024-07-07 PROBLEM — R73.09 ELEVATED GLUCOSE TOLERANCE TEST: Status: RESOLVED | Noted: 2017-05-18 | Resolved: 2024-07-07

## 2024-07-07 LAB
ALBUMIN SERPL-MCNC: 3.2 G/DL (ref 3.5–5.2)
ALBUMIN/GLOB SERPL: 1 {RATIO} (ref 1–2.5)
ALP SERPL-CCNC: 133 U/L (ref 35–104)
ALT SERPL-CCNC: 14 U/L (ref 10–35)
ANION GAP SERPL CALCULATED.3IONS-SCNC: 15 MMOL/L (ref 9–16)
AST SERPL-CCNC: 29 U/L (ref 10–35)
BASOPHILS # BLD: <0.03 K/UL (ref 0–0.2)
BASOPHILS NFR BLD: 0 % (ref 0–2)
BILIRUB SERPL-MCNC: 0.3 MG/DL (ref 0–1.2)
BUN SERPL-MCNC: 2 MG/DL (ref 6–20)
CALCIUM SERPL-MCNC: 7.1 MG/DL (ref 8.6–10.4)
CHLORIDE SERPL-SCNC: 103 MMOL/L (ref 98–107)
CO2 SERPL-SCNC: 18 MMOL/L (ref 20–31)
CREAT SERPL-MCNC: 0.6 MG/DL (ref 0.5–0.9)
EOSINOPHIL # BLD: 0.1 K/UL (ref 0–0.44)
EOSINOPHILS RELATIVE PERCENT: 1 % (ref 1–4)
ERYTHROCYTE [DISTWIDTH] IN BLOOD BY AUTOMATED COUNT: 15.5 % (ref 11.8–14.4)
GFR, ESTIMATED: >90 ML/MIN/1.73M2
GLUCOSE SERPL-MCNC: 130 MG/DL (ref 74–99)
HCT VFR BLD AUTO: 34 % (ref 36.3–47.1)
HGB BLD-MCNC: 10.4 G/DL (ref 11.9–15.1)
IMM GRANULOCYTES # BLD AUTO: <0.03 K/UL (ref 0–0.3)
IMM GRANULOCYTES NFR BLD: 0 %
LYMPHOCYTES NFR BLD: 1.27 K/UL (ref 1.1–3.7)
LYMPHOCYTES RELATIVE PERCENT: 17 % (ref 24–43)
MAGNESIUM SERPL-MCNC: 4.4 MG/DL (ref 1.6–2.6)
MCH RBC QN AUTO: 24.9 PG (ref 25.2–33.5)
MCHC RBC AUTO-ENTMCNC: 30.6 G/DL (ref 28.4–34.8)
MCV RBC AUTO: 81.3 FL (ref 82.6–102.9)
MONOCYTES NFR BLD: 0.33 K/UL (ref 0.1–1.2)
MONOCYTES NFR BLD: 5 % (ref 3–12)
NEUTROPHILS NFR BLD: 77 % (ref 36–65)
NEUTS SEG NFR BLD: 5.6 K/UL (ref 1.5–8.1)
NRBC BLD-RTO: 0 PER 100 WBC
PLATELET # BLD AUTO: 144 K/UL (ref 138–453)
PMV BLD AUTO: 11.6 FL (ref 8.1–13.5)
POTASSIUM SERPL-SCNC: 3.7 MMOL/L (ref 3.7–5.3)
PROT SERPL-MCNC: 6 G/DL (ref 6.6–8.7)
RBC # BLD AUTO: 4.18 M/UL (ref 3.95–5.11)
RBC # BLD: ABNORMAL 10*6/UL
SODIUM SERPL-SCNC: 136 MMOL/L (ref 136–145)
WBC OTHER # BLD: 7.3 K/UL (ref 3.5–11.3)

## 2024-07-07 PROCEDURE — 36415 COLL VENOUS BLD VENIPUNCTURE: CPT

## 2024-07-07 PROCEDURE — 83735 ASSAY OF MAGNESIUM: CPT

## 2024-07-07 PROCEDURE — 80053 COMPREHEN METABOLIC PANEL: CPT

## 2024-07-07 PROCEDURE — 1220000000 HC SEMI PRIVATE OB R&B

## 2024-07-07 PROCEDURE — 2580000003 HC RX 258

## 2024-07-07 PROCEDURE — 6370000000 HC RX 637 (ALT 250 FOR IP)

## 2024-07-07 PROCEDURE — 85025 COMPLETE CBC W/AUTO DIFF WBC: CPT

## 2024-07-07 PROCEDURE — 6360000002 HC RX W HCPCS

## 2024-07-07 RX ORDER — SENNA AND DOCUSATE SODIUM 50; 8.6 MG/1; MG/1
2 TABLET, FILM COATED ORAL 2 TIMES DAILY
Qty: 120 TABLET | Refills: 1 | Status: SHIPPED | OUTPATIENT
Start: 2024-07-08 | End: 2024-08-07

## 2024-07-07 RX ORDER — NIFEDIPINE 30 MG/1
30 TABLET, EXTENDED RELEASE ORAL DAILY
Qty: 30 TABLET | Refills: 3 | Status: SHIPPED | OUTPATIENT
Start: 2024-07-08 | End: 2024-07-09 | Stop reason: HOSPADM

## 2024-07-07 RX ADMIN — SENNOSIDES AND DOCUSATE SODIUM 2 TABLET: 50; 8.6 TABLET ORAL at 20:30

## 2024-07-07 RX ADMIN — SODIUM CHLORIDE, PRESERVATIVE FREE 10 ML: 5 INJECTION INTRAVENOUS at 20:30

## 2024-07-07 RX ADMIN — MAGNESIUM SULFATE HEPTAHYDRATE 2000 MG/HR: 40 INJECTION, SOLUTION INTRAVENOUS at 12:20

## 2024-07-07 RX ADMIN — NIFEDIPINE 30 MG: 30 TABLET, FILM COATED, EXTENDED RELEASE ORAL at 08:06

## 2024-07-07 RX ADMIN — IBUPROFEN 600 MG: 600 TABLET, FILM COATED ORAL at 10:20

## 2024-07-07 RX ADMIN — IBUPROFEN 600 MG: 600 TABLET, FILM COATED ORAL at 20:29

## 2024-07-07 RX ADMIN — MAGNESIUM SULFATE HEPTAHYDRATE 2000 MG/HR: 40 INJECTION, SOLUTION INTRAVENOUS at 02:03

## 2024-07-07 RX ADMIN — SODIUM CHLORIDE, PRESERVATIVE FREE 10 ML: 5 INJECTION INTRAVENOUS at 08:10

## 2024-07-07 ASSESSMENT — PAIN SCALES - GENERAL
PAINLEVEL_OUTOF10: 7
PAINLEVEL_OUTOF10: 7

## 2024-07-07 ASSESSMENT — PAIN DESCRIPTION - LOCATION: LOCATION: ABDOMEN;VAGINA

## 2024-07-07 ASSESSMENT — PAIN DESCRIPTION - DESCRIPTORS: DESCRIPTORS: CRAMPING;SORE

## 2024-07-07 NOTE — PROGRESS NOTES
Resident Interval Magnesium Sulfate Note    Valarie Lea is a 28 y.o. female   PPD# 1 s/p  at [place] on 24 with PreE with SF (Bps)  The patient is resting comfortably. She denies headache, visual changes, and abdominal pain in the right upper quadrant. She denies any shortness of breath or chest pain. She denies change in her extremities, regarding swelling.    Continuous Medications:    sodium chloride      lactated ringers IV soln 75 mL/hr at 24 1546    magnesium sulfate 2,000 mg/hr (24 0203)       Vitals:    Vitals:    24 0400 24 0500 24 0513 24 0713   BP: 106/73 128/87 118/78 119/74   Pulse: 67 75 70 77   Resp: 17 17 17 17   Temp:       TempSrc:       SpO2: 97% 97% 98% 99%         Physical Exam:  Chest: clear to auscultation bilaterally  Heart: RRR no murmur  Abdomen: soft, nontender, nondistended  Extremities: DTR normal Bilateral upper extremities Right: +2/4   Left: +2/4  Clonus: absent    Urine Output: 850 over 4 hours 212/hr; Clear urine    Labs:  Last Magnesium Level:   Lab Results   Component Value Date/Time    MG 1.8 2019 06:11 PM       BMP:    Recent Labs     24  1240      K 3.7      CO2 19*   BUN 6   CREATININE <0.4*   GLUCOSE 106*       ASSESSMENT/PLAN  Valarie Lea is a 28 y.o. female   PPD# 1 s/p  at [place] on 24 with PreE with SF (Bps)   - Continue Magnesium Sulfate Treatment 2g/hr, off @ 1557 on 24   - BPs normotensive   - Patient denies any s/s PreE   - UOP adequate   - PreE labs wnl, P/C 10.7 ()    - Currently controlled on Procardia 30 mg XL   - Last IV anti-hypertensive Labetalol 40 mg IV x1 at [place]   - Continue current management    Amanda Mcintosh DO  Ob/Gyn Resident  2024, 9:08 AM

## 2024-07-07 NOTE — PROGRESS NOTES
Resident Interval Magnesium Sulfate Note    Valarie Lea is a 28 y.o. female  PPD# 1 s/p  at [place] on 24 with PreE with SF (Bps)  The patient is resting comfortably. She denies headache and visual changes. She denies any shortness of breath or chest pain. She denies change in her extremities, regarding swelling.    Continuous Medications:    sodium chloride      lactated ringers IV soln 75 mL/hr at 24 1546    magnesium sulfate 2,000 mg/hr (24 0203)       Vitals:    Vitals:    24 0200 24 0300 24 0400 24 0500   BP: 108/75 101/76 106/73 128/87   Pulse: 83 70 67 75   Resp: 17 18 17 17   Temp:       TempSrc:       SpO2: 99% 98% 97% 97%         Physical Exam:  Chest: clear to auscultation bilaterally  Heart: RRR no murmur  Abdomen: soft, nontender, nondistended  Extremities: DTR normal Bilateral patellar extremities Right: 2/4   Left: 4/4  Clonus: absent    Urine Output: 425cc/hr; Clear urine    Labs:  Last Magnesium Level:   Lab Results   Component Value Date/Time    MG 1.8 2019 06:11 PM       BMP:    Recent Labs     24  1240      K 3.7      CO2 19*   BUN 6   CREATININE <0.4*   GLUCOSE 106*       ASSESSMENT/PLAN  Valarie Lea is a 28 y.o. female  PPD# 1 s/p  at [place] on 24 with PreE with SF (Bps)   - Continue Magnesium Sulfate Treatment 2g/hr, off @ 1557 on    - Mag levels q6hrs per provider if symptomatic   - BPs normotensive   - Patient denies any s/s PreE   - UOP adequate    - PreE labs wnl, P/C 10.7              - Currently controlled on Procardia 30 XL ()              - Last IV anti-hypertensive at [place] at time of delivery   - Continue to monitor      Ruthy Rockwell MD  Ob/Gyn Resident  2024, 5:29 AM

## 2024-07-07 NOTE — CARE COORDINATION
CASE MANAGEMENT POST-PARTUM TRANSITIONAL CARE PLAN    38 weeks gestation of pregnancy [Z3A.38]    OB Provider: None- No/Limited PNC. She stated will do PP care @ [facility] (cm will send email to clinic to set up f/u appointment)    Writer met miah/ Valarie at her bedside to discuss DCP. She is S/P  at [facility] on 24. After delivery she and her  were transferred to [facility].     Writer verified address/phone number correct on facesheet. She states she lives with her son. She denied barriers with transportation home, to doctors appointments or with paying for medications upon discharge home. CM asked how she and baby will be getting home since she came via EMS. She said probably Uber.     CM asked if she had a car seat, she said not yet as she wasn't expecting baby to come so soon. Since she doesn't have a car seat CM asked if she had a safe place for baby to sleep at home, she also verbalized \"not yet.\" CM informed her she will need both for baby to go home. She verbalized understanding.    [facility] [place] Medicaid insurance correct. Writer notified her she has 30 days from date of birth to add  to insurance policy by contacting [employer]. She verbalized understanding.    Infant name on BC: Not sure but maybe [place].   Infant PCP [facility].     DME: none  HOME CARE: none    Anticipate DC home of couplet in private vehicle in 1-2 days status post vaginal delivery.      Readmission Risk              Risk of Unplanned Readmission:  8

## 2024-07-07 NOTE — PROGRESS NOTES
Resident Interval Magnesium Sulfate Note    Valarie Lea is a 28 y.o. female   PPD# 1 s/p  at [place] on 24 with PreE with SF (Bps)  The patient is resting comfortably. She denies headache, visual changes, and abdominal pain in the right upper quadrant. She denies any shortness of breath or chest pain. She denies change in her extremities, regarding swelling.    Continuous Medications:    sodium chloride      lactated ringers IV soln 75 mL/hr at 24 1546    magnesium sulfate 2,000 mg/hr (24 1220)       Vitals:    Vitals:    24 1114 24 1215 24 1315 24 1415   BP: 120/84 (!) 141/84 134/87 108/68   Pulse: 90 100 96 90   Resp: 18 18 18 20   Temp:       TempSrc:       SpO2: 97% 98% 99% 97%         Physical Exam:  Chest: clear to auscultation bilaterally  Heart: RRR no murmur  Abdomen: soft, nontender, nondistended  Extremities: DTR normal Bilateral upper extremities Right: +2/4   Left: +2/4  Clonus: absent    Urine Output: 400 over 4 hours 100ml/hr; Clear urine    Labs:  Last Magnesium Level:   Lab Results   Component Value Date/Time    MG 4.4 2024 11:17 AM       BMP:    Recent Labs     24  1240 24  1117    136   K 3.7 3.7    103   CO2 19* 18*   BUN 6 2*   CREATININE <0.4* 0.6   GLUCOSE 106* 130*       ASSESSMENT/PLAN  Valarie Lea is a 28 y.o. female   PPD# 1 s/p  at [place] on 24 with PreE with SF (Bps)   - Continue Magnesium Sulfate Treatment 2g/hr, off @ 1557 on 24   - BPs normotensive, one elevated pressure @ 1215 today    - Patient denies any s/s PreE   - UOP adequate   - PreE labs wnl, P/C 10.7 ()    - Currently controlled on Procardia 30 mg XL   - Last IV anti-hypertensive Labetalol 40 mg IV x1 at [place]   - Continue current management    Saranyaheather Sofia DO  Ob/Gyn Resident  2024, 2:27 PM

## 2024-07-07 NOTE — PROGRESS NOTES
RhoGAM/MMR not indicated    Bottle feeding    - Denies s/s of mastitis    PreE w/ SF (BP)   - s/p Mag x 24 hours   - Intermittently elevated BP, non-severe, mostly normotensive   - Patient denies any s/s of PreE   - UOP adequate   - PreE labs wnl x 2, P/C 10.7 (7/6)    - Last IV anti-hypertensive at [place] at time of delivery    - Continue Procardia 30 mg XL qd, consider titration up if patient continues to have elevated BP during PP course     Gestational Thrombocytopenia   - Plt 143 on admission   - Plt 144 on most recent CBC (7/7)   - Likely gestational thrombocytopenia. Anticipate resolution in remote PP period    Limited/No PNC   - SW consult placed. Appreciate recommendations    History of Dysmenorrhea   - Patient desires Depo   - Depo injection pending     BMI 33     Continue post partum care    Counseling Completed:  Secondary Smoke risks and Sudden Infant Death Syndrome were reviewed with recommendations. Infant sleeping, \"back to sleep\" and avoidance of co-sleeping recommendations were reviewed.  Signs and Symptoms of Post Partum Depression were reviewed. The patient is to call if any occur.  Signs and symptoms of Mastitis were reviewed. The patient is to call if any occur for follow up.  Discharge instructions including pelvic rest, no driving with pain medicine and office follow-up were reviewed with patient     Attending Physician: Dr. Geovanny Miller MD  Ob/Gyn Resident   7/8/2024, 3:34 AM    Resident Physician Statement  I have discussed the case, including pertinent history and exam findings with the above. I have personally seen the patient. I agree with the assessment, plan and orders as documented. I have made changes to the above note as needed. I have discussed the case with above named attending.     Patient is in agreement with plan. All questions answered.    Jose D Timmons MD  OB/GYN Resident  7/8/2024  3:34 AM      Attending Physician Statement  I have personally seen,  evaluated and discussed the care of Valarie Lea, including pertinent history and exam findings with the resident. I have reviewed and edited their note in the electronic medical record. The key elements of all parts of the encounter have been performed/reviewed by me.  I agree with the assessment, plan and orders as documented by the resident.     The level of care submitted represents to the best of my ability the care documented in the medical record today.    GC Modifier.  This service has been performed in part by a resident under the direction of a teaching physician.    Pt states she is doing well with no concerns. Social work helping with baby items. BPs remain well controlled on Procardia 30 mg XL daily but will plan to titrate higher if BPs are consistently 150s/100s. Anticipate discharge home tomorrow.     Attending's Name:  Libby Small MD  Date: 7/8/2024  Time: 10:48 AM

## 2024-07-08 PROCEDURE — 2580000003 HC RX 258

## 2024-07-08 PROCEDURE — 6370000000 HC RX 637 (ALT 250 FOR IP)

## 2024-07-08 PROCEDURE — 6360000002 HC RX W HCPCS

## 2024-07-08 PROCEDURE — 1220000000 HC SEMI PRIVATE OB R&B

## 2024-07-08 RX ORDER — NIFEDIPINE 30 MG/1
30 TABLET, EXTENDED RELEASE ORAL ONCE
Status: COMPLETED | OUTPATIENT
Start: 2024-07-08 | End: 2024-07-08

## 2024-07-08 RX ORDER — MEDROXYPROGESTERONE ACETATE 150 MG/ML
150 INJECTION, SUSPENSION INTRAMUSCULAR ONCE
Status: COMPLETED | OUTPATIENT
Start: 2024-07-08 | End: 2024-07-08

## 2024-07-08 RX ORDER — MEDROXYPROGESTERONE ACETATE 150 MG/ML
150 INJECTION, SUSPENSION INTRAMUSCULAR
Status: DISCONTINUED | OUTPATIENT
Start: 2024-07-08 | End: 2024-07-08

## 2024-07-08 RX ORDER — NIFEDIPINE 30 MG/1
60 TABLET, EXTENDED RELEASE ORAL DAILY
Status: DISCONTINUED | OUTPATIENT
Start: 2024-07-09 | End: 2024-07-09 | Stop reason: HOSPADM

## 2024-07-08 RX ADMIN — ACETAMINOPHEN 1000 MG: 500 TABLET ORAL at 08:15

## 2024-07-08 RX ADMIN — IBUPROFEN 600 MG: 600 TABLET, FILM COATED ORAL at 08:15

## 2024-07-08 RX ADMIN — NIFEDIPINE 30 MG: 30 TABLET, FILM COATED, EXTENDED RELEASE ORAL at 08:13

## 2024-07-08 RX ADMIN — NIFEDIPINE 30 MG: 30 TABLET, FILM COATED, EXTENDED RELEASE ORAL at 21:45

## 2024-07-08 RX ADMIN — IBUPROFEN 600 MG: 600 TABLET, FILM COATED ORAL at 16:40

## 2024-07-08 RX ADMIN — MEDROXYPROGESTERONE ACETATE 150 MG: 150 INJECTION, SUSPENSION, EXTENDED RELEASE INTRAMUSCULAR at 04:56

## 2024-07-08 RX ADMIN — SENNOSIDES AND DOCUSATE SODIUM 2 TABLET: 50; 8.6 TABLET ORAL at 21:46

## 2024-07-08 RX ADMIN — ACETAMINOPHEN 1000 MG: 500 TABLET ORAL at 22:53

## 2024-07-08 RX ADMIN — SODIUM CHLORIDE, PRESERVATIVE FREE 10 ML: 5 INJECTION INTRAVENOUS at 08:22

## 2024-07-08 RX ADMIN — ACETAMINOPHEN 1000 MG: 500 TABLET ORAL at 16:40

## 2024-07-08 RX ADMIN — IBUPROFEN 600 MG: 600 TABLET, FILM COATED ORAL at 22:53

## 2024-07-08 ASSESSMENT — PAIN SCALES - GENERAL
PAINLEVEL_OUTOF10: 4
PAINLEVEL_OUTOF10: 6
PAINLEVEL_OUTOF10: 2
PAINLEVEL_OUTOF10: 8

## 2024-07-08 ASSESSMENT — PAIN DESCRIPTION - DESCRIPTORS
DESCRIPTORS: CRAMPING
DESCRIPTORS: ACHING;DISCOMFORT;CRAMPING
DESCRIPTORS: ACHING;DISCOMFORT

## 2024-07-08 ASSESSMENT — PAIN DESCRIPTION - PAIN TYPE
TYPE: ACUTE PAIN
TYPE: ACUTE PAIN

## 2024-07-08 ASSESSMENT — PAIN DESCRIPTION - LOCATION
LOCATION: ABDOMEN
LOCATION: HEAD
LOCATION: BACK;ABDOMEN
LOCATION: HEAD

## 2024-07-08 ASSESSMENT — PAIN - FUNCTIONAL ASSESSMENT
PAIN_FUNCTIONAL_ASSESSMENT: ACTIVITIES ARE NOT PREVENTED

## 2024-07-08 ASSESSMENT — PAIN DESCRIPTION - FREQUENCY
FREQUENCY: INTERMITTENT
FREQUENCY: INTERMITTENT

## 2024-07-08 NOTE — PLAN OF CARE
Problem: Pain  Goal: Verbalizes/displays adequate comfort level or baseline comfort level  7/8/2024 1323 by Pooja Fischer RN  Outcome: Progressing  7/8/2024 0501 by Carisa Bustamante RN  Outcome: Progressing     Problem: Safety - Adult  Goal: Free from fall injury  7/8/2024 1323 by Pooja Fischer, RN  Outcome: Progressing  7/8/2024 0501 by Carisa Bustamante, RN  Outcome: Progressing

## 2024-07-08 NOTE — PROGRESS NOTES
CLINICAL PHARMACY NOTE: MEDS TO BEDS    Total # of Prescriptions Filled: 2   The following medications were delivered to the patient:  Acetaminophen ES  Ibuprofen  Nifedipine (delivered 7/9)    Additional Documentation: senna-s was sent to [facility] per [employer].

## 2024-07-08 NOTE — CARE COORDINATION
Social Work     Sw reviewed medical record (current active problem list) and tox screens and found no current concerns.    Medical records show no pnc, mom arrived at [place], had  and mom and baby transported to [facility].       Sw spoke with mom briefly to explain Sw role, inquire if any needs or concerns, and provide safe sleep education and discuss.      Mom reports she does not have a carseat to get baby home, and does not have any place for baby to sleep.  Mom reports she gets paid tomorrow, and wasn't sure when baby would be born (no pnc).      Mom denied any current s/s of anxiety or depression and is aware to reach out to OB if any s/s occur after dc.    Mom reports ongoing anxiety, reports currently at baseline. Mom was flat affect during assessment, at times became tearful.  Sw encouraged mom to speak to 's if this is beyond her baseline, mom continued to deny need to speak to Dr.      Mom reports no local support system, reports fob passed away, reports she is from [place], moved here a few years ago, her mom resides in [place], and denied any current questions or needs.      Mom reports she works at [employer].      Mom reports this is her 2nd child, she also has a 6 year old daughter who is currently with paternal great grandmother while mom is in the hospital.       Mom states ped will be FCC, mom denied barriers to appts.    Mom reports her car is at [facility] and she plans to Uber to [place] upon Dc.     Sw educated mom on medicaid cab, and informed her she can utilize cab for dc, but must have carseat for baby first ([place] medicaid cab number provided to mom via text).    Mom continues to deny any way of obtaining carseat or safe sleep space for dc today. Sw offered to call [facility] with mom for FIN program, mom declined, does not want to speak to them.  Mom is aware Sw to make referral for support.      LCAJAY (Kimberly) informed of above.      Mom open to [facility]  and [facility] referral, Darshan submitted.  Mom states she will reach out to WIC at [facility].       Darshan also informed DARSHAN Jiang at [facility] about family to be involved as OP support for mom and baby.      Staff updated on case, no dc for baby until [facility] relays plan.      Sw encouraged mom to reach out if any issues or concerns arise.

## 2024-07-08 NOTE — CARE COORDINATION
Social Work    [facility] brought mom a pnp and states mom will obtain carseat after getting paid tomorrow.      Baby is cleared to dc home with mom.  [facility] will follow up with mom after dc to see the home.

## 2024-07-08 NOTE — CARE COORDINATION
Case Management    CM rec'd call from Pooja RN asking if we could provide Valarie w/ some clothes for DC. She doesn't have any here since she was in the ED at [facility] and delivered then brought to [facility] via EMS.     CM obtained shorts, tshrit and underwear and gave to Valarie.     CM also requested a BP cuff for DC. Pooja brought to bedside. Pooja informed this CM the doctors will be keeping her 1 more night due to her BPs and overall clinical status    [place] was transferred to [facility] NICU due to persistent tachypnea w/ retractions, so CM also discussed the following [facility] information :    Discussed family centered rounds occur @ 11:30 am daily, notified bedside sign-out occurs daily 8a, 8p  Discussed/offered Home Away from Home (HAFH) and/or [facility]) when applicable  Discussed the [facility] is a locked unit and need for ID band through infant's discharge  Notified that FOB and any visitors must go to 6th floor and obtain a badge or sticker, all visitors must be over the age of 18 and with a bandholder. Total visitors are limited to 3 total at the bedside.  Phone number to reach NICU written on the board and the number to call with phone in room circled

## 2024-07-08 NOTE — CARE COORDINATION
Social Work    [facility] CW Michelle Dalal coming to see mom within next 2 hours.     Please do not dc her until [facility] meets with her.      7th floor updated.

## 2024-07-09 VITALS
RESPIRATION RATE: 16 BRPM | OXYGEN SATURATION: 98 % | DIASTOLIC BLOOD PRESSURE: 87 MMHG | SYSTOLIC BLOOD PRESSURE: 123 MMHG | TEMPERATURE: 98.2 F | HEART RATE: 94 BPM

## 2024-07-09 PROCEDURE — 6370000000 HC RX 637 (ALT 250 FOR IP)

## 2024-07-09 PROCEDURE — 99238 HOSP IP/OBS DSCHRG MGMT 30/<: CPT | Performed by: OBSTETRICS & GYNECOLOGY

## 2024-07-09 RX ORDER — NIFEDIPINE 30 MG/1
60 TABLET, EXTENDED RELEASE ORAL DAILY
Qty: 30 TABLET | Refills: 3 | Status: SHIPPED | OUTPATIENT
Start: 2024-07-09 | End: 2024-07-11 | Stop reason: CLARIF

## 2024-07-09 RX ADMIN — NIFEDIPINE 60 MG: 30 TABLET, FILM COATED, EXTENDED RELEASE ORAL at 09:40

## 2024-07-09 RX ADMIN — ACETAMINOPHEN 1000 MG: 500 TABLET ORAL at 05:21

## 2024-07-09 RX ADMIN — IBUPROFEN 600 MG: 600 TABLET, FILM COATED ORAL at 05:21

## 2024-07-09 RX ADMIN — SENNOSIDES AND DOCUSATE SODIUM 2 TABLET: 50; 8.6 TABLET ORAL at 09:40

## 2024-07-09 NOTE — FLOWSHEET NOTE
Discharge instructions given with understanding voiced. BP cuff given for home use. Instructed to take BP 2x/day, record on log and take to follow up appt in 3 days. Instructed to go to ER if BPs fall in severe range or experiencing symptoms of pre-eclampsia. Understanding voiced.[facility] Save your life: Post-Birth Warning Signs handout reviewed and given to mother. Understanding verbalized.

## 2024-07-09 NOTE — PROGRESS NOTES
POST PARTUM DAY # 3    Valarie Lea is a 28 y.o. female  This patient was seen & examined today.     Her pregnancy was complicated by:   Patient Active Problem List   Diagnosis    Hx of Trichimoniasis (REMY neg)    Need for diphtheria-tetanus-pertussis (Tdap) vaccine     8/15/17 M Apg 8 Wt 7#6     @ [facility] M/F Apg  Wt 6#12    Limited/No PNC (G2)    PreE w/ SF (G2)     Today she is doing well without any chief complaint. Her lochia is light. She denies chest pain, shortness of breath, headache, lightheadedness, and blurred vision. She is not breast feeding and she admits to breast engorgement with mild tenderness. Denies any fevers/chills or s/s associated with mastitis. She is ambulating well. Her voiding pattern is normal. I reviewed signs and symptoms of post partum depression with the patient, she currently denies any of these symptoms. She is tolerating solids.     Vital Signs:  Vitals:    24 1640 24 2140 24 0109 24 0501   BP: (!) 131/101 (!) 139/99 122/80 132/86   Pulse: 77 87 65 70   Resp: 16 16 16 16   Temp: 98.1 °F (36.7 °C) 97.7 °F (36.5 °C) 98.4 °F (36.9 °C) 98.1 °F (36.7 °C)   TempSrc: Oral Oral Oral Oral   SpO2: 98% 99%       Physical Exam: breast exam performed in the presence of KAE Truong as chaperone  General:  no apparent distress, alert, and cooperative  Neurologic:  alert, oriented, normal speech, no focal findings or movement disorder noted  Breasts: breasts appear normal, no suspicious masses, no skin or nipple changes or axillary nodes, symmetrical appearance without any no erythema or warmth to suggest mastitis, nipples normal.  Lungs:  No increased work of breathing  Heart:  regular rate and rhythm    Abdomen: abdomen soft, non-distended, non-tender  Fundus: non-tender, normal size, firm, below umbilicus  Extremities:  no calf tenderness, non edematous    Lab:  Lab Results   Component Value Date    HGB 10.4 (L) 2024     Lab Results   Component  Sudden Infant Death Syndrome were reviewed with recommendations. Infant sleeping, \"back to sleep\" and avoidance of co-sleeping recommendations were reviewed.  Signs and Symptoms of Post Partum Depression were reviewed. The patient is to call if any occur.  Signs and symptoms of Mastitis were reviewed. The patient is to call if any occur for follow up.  Discharge instructions including pelvic rest and office follow-up were reviewed with patient     Attending Physician: Dr. Marty Miller MD  Ob/Gyn Resident   2024, 3:22 AM    Resident Physician Statement  I have discussed the case, including pertinent history and exam findings with the above. I have personally seen the patient. I agree with the assessment, plan and orders as documented. I have made changes to the above note as needed. I have discussed the case with above named attending.     Patient is in agreement with plan. All questions answered.    Jose D Timmons MD  OB/GYN Resident  2024  9:04 AM    Date: 2024  Time: 10:51 AM      Patient Name: Valarie Lea  Patient : 1995  Room/Bed: 0734/0734-01  Admission Date/Time: 2024  3:15 PM        Attending Physician Statement  I have discussed the care of Valarie Lea, including pertinent history and exam findings with the resident. I have reviewed and edited their note in the electronic medical record. The key elements of all parts of the encounter have been performed/reviewed by me .  I agree with the assessment, plan and orders as documented by the resident.     The level of care submitted represents to the best of my ability the care documented in the medical record today.    GC Modifier.  This service has been performed in part by a resident under the direction of a teaching physician.    Patient doing well, no complaints. Plan to DC later today.     Attending's Name:  BROOKE SARAVIA DO

## 2024-07-09 NOTE — PLAN OF CARE
Problem: Pain  Goal: Verbalizes/displays adequate comfort level or baseline comfort level  Outcome: HH/HSPC Resolved Met     Problem: Safety - Adult  Goal: Free from fall injury  Outcome: HH/HSPC Resolved Met     Problem: Risk for Maternal Injury  Goal: Remains free from seizures and injury related to seizure activity  Outcome: HH/HSPC Resolved Met     Problem: Risk for Decreased Cardiac Output  Goal: Maintains optimal cardiac output and hemodynamic stability  Outcome: HH/HSPC Resolved Met  Goal: Achieves optimal ventilation and oxygenation  Outcome: HH/HSPC Resolved Met     Problem: Risk for Deficient Fluid Volume  Goal: Hemodynamic stability and optimal renal function maintained  Outcome: HH/HSPC Resolved Met

## 2024-07-10 LAB — SURGICAL PATHOLOGY REPORT: NORMAL

## 2024-07-11 ENCOUNTER — POSTPARTUM VISIT (OUTPATIENT)
Dept: OBGYN | Age: 29
End: 2024-07-11
Payer: COMMERCIAL

## 2024-07-11 VITALS
HEART RATE: 106 BPM | SYSTOLIC BLOOD PRESSURE: 152 MMHG | DIASTOLIC BLOOD PRESSURE: 101 MMHG | BODY MASS INDEX: 30.21 KG/M2 | WEIGHT: 176 LBS

## 2024-07-11 DIAGNOSIS — I10 PRIMARY HYPERTENSION: ICD-10-CM

## 2024-07-11 PROBLEM — Z23 NEED FOR DIPHTHERIA-TETANUS-PERTUSSIS (TDAP) VACCINE: Status: RESOLVED | Noted: 2017-07-05 | Resolved: 2024-07-11

## 2024-07-11 PROCEDURE — 99024 POSTOP FOLLOW-UP VISIT: CPT | Performed by: STUDENT IN AN ORGANIZED HEALTH CARE EDUCATION/TRAINING PROGRAM

## 2024-07-11 PROCEDURE — 99211 OFF/OP EST MAY X REQ PHY/QHP: CPT | Performed by: STUDENT IN AN ORGANIZED HEALTH CARE EDUCATION/TRAINING PROGRAM

## 2024-07-11 RX ORDER — NIFEDIPINE 90 MG/1
90 TABLET, EXTENDED RELEASE ORAL DAILY
Qty: 90 TABLET | Refills: 1 | Status: SHIPPED | OUTPATIENT
Start: 2024-07-11

## 2024-07-11 NOTE — PROGRESS NOTES
Postpartum Visit    Valarie Lea is a 28 y.o. female , 5 days Post Partum s/p  spontaneous vaginal delivery on 24 at [facility] Emergency Department    The patient was seen. She has no chief complaint today. She is here today for BP check due to PreE w/ SF diagnosed immediately postpartum. She is status post magnesium sulfate treatment x24hrs. She was discharged home with Procardia XL 60mg QD.     She denies any s/s PreE including headache, vision changes, shortness of breath, RUQ pain, or peripheral edema    Her pregnancy was complicated by:  Patient Active Problem List    Diagnosis Date Noted    PreE w/ SF (G2) 2024     Overview Note:     S/p mag x 24 hours post-delivery  Discharged home on Procardia XL 60mg QD  24: Procardia increased to 90mg QD       @ .  24 M/F Apg 9/9 Wt 6#12 2024     Overview Note:     Delivered at [facility] ED, transferred to [facility] for postpartum care      Limited/No PNC (G2) 2024     8/15/17 M Apg 8/9 Wt 7#6 08/15/2017    Hx of Trichimoniasis (REMY neg) 2017     Overview Note:     5/15 Was positive and treated, REMY sent was neg         Vitals:   Blood pressure (!) 152/101, pulse (!) 106, weight 79.8 kg (176 lb), last menstrual period 07/10/2023, currently breastfeeding.    Physical Exam:  General:  no apparent distress, alert, and cooperative  Lungs:  No increased work of breathing, good air exchange  Heart:  Regular rate  Abdomen: Abdomen soft, non-distended  Extremities:  non edematous, non erythematous     Assessment:  Valarie Lea is a 28 y.o. female , 5 days Post Partum s/p spontaneous vaginal delivery on 24 at [facility] Emergency Department here for BP check   - BP elevated today 141/97 with repeat 152/101   - Patient reports checking her BP twice daily and usually getting systolic values in the 150s   - Denies s/s PreE    - S/p Mag sulfate x24hrs    - Compliant with Procardia XL 60mg QD, increased to 90mg QD

## 2024-07-15 NOTE — PROGRESS NOTES
Attending Physician Statement  I have discussed the care of Valarie Lea, including pertinent history and exam findings,  with the resident. I have reviewed the key elements of all parts of the encounter with the resident.  I agree with the assessment, plan and orders as documented by the resident.  (GE Modifier)    Stella Ferguson,

## 2024-09-03 ENCOUNTER — TELEPHONE (OUTPATIENT)
Dept: OBGYN | Age: 29
End: 2024-09-03

## 2024-09-23 ENCOUNTER — HOSPITAL ENCOUNTER (OUTPATIENT)
Age: 29
Setting detail: SPECIMEN
Discharge: HOME | End: 2024-09-23

## 2024-09-23 ENCOUNTER — OFFICE VISIT (OUTPATIENT)
Dept: OBGYN | Age: 29
End: 2024-09-23
Payer: COMMERCIAL

## 2024-09-23 VITALS
DIASTOLIC BLOOD PRESSURE: 104 MMHG | WEIGHT: 185 LBS | SYSTOLIC BLOOD PRESSURE: 153 MMHG | HEART RATE: 98 BPM | BODY MASS INDEX: 31.76 KG/M2

## 2024-09-23 DIAGNOSIS — N89.8 VAGINAL DISCHARGE: ICD-10-CM

## 2024-09-23 DIAGNOSIS — F41.9 ANXIETY: ICD-10-CM

## 2024-09-23 DIAGNOSIS — N93.9 VAGINAL BLEEDING: Primary | ICD-10-CM

## 2024-09-23 RX ORDER — SERTRALINE HYDROCHLORIDE 25 MG/1
25 TABLET, FILM COATED ORAL DAILY
Qty: 7 TABLET | Refills: 0 | Status: SHIPPED | OUTPATIENT
Start: 2024-09-23

## 2024-09-23 RX ORDER — HYDROXYZINE HYDROCHLORIDE 25 MG/1
25 TABLET, FILM COATED ORAL NIGHTLY
Qty: 90 TABLET | Refills: 2 | Status: SHIPPED | OUTPATIENT
Start: 2024-09-23 | End: 2025-06-20

## 2024-09-24 ENCOUNTER — PATIENT MESSAGE (OUTPATIENT)
Dept: OBGYN | Age: 29
End: 2024-09-24

## 2024-09-24 DIAGNOSIS — N76.0 BACTERIAL VAGINOSIS: Primary | ICD-10-CM

## 2024-09-24 DIAGNOSIS — N89.8 VAGINAL DISCHARGE: ICD-10-CM

## 2024-09-24 DIAGNOSIS — B96.89 BACTERIAL VAGINOSIS: Primary | ICD-10-CM

## 2024-09-24 LAB
C TRACH DNA SPEC QL PROBE+SIG AMP: NEGATIVE
CANDIDA SPECIES: NEGATIVE
GARDNERELLA VAGINALIS: POSITIVE
N GONORRHOEA DNA SPEC QL PROBE+SIG AMP: NEGATIVE
SOURCE: ABNORMAL
SPECIMEN DESCRIPTION: NORMAL
TRICHOMONAS: NEGATIVE

## 2024-09-24 RX ORDER — METRONIDAZOLE 500 MG/1
500 TABLET ORAL 2 TIMES DAILY
Qty: 14 TABLET | Refills: 0 | Status: SHIPPED | OUTPATIENT
Start: 2024-09-24 | End: 2024-09-24 | Stop reason: CLARIF

## 2024-09-24 RX ORDER — METRONIDAZOLE 500 MG/1
500 TABLET ORAL 2 TIMES DAILY
Qty: 14 TABLET | Refills: 0 | Status: SHIPPED | OUTPATIENT
Start: 2024-09-24 | End: 2024-10-01

## 2024-10-03 ENCOUNTER — TELEPHONE (OUTPATIENT)
Dept: OBGYN | Age: 29
End: 2024-10-03

## 2024-10-03 NOTE — TELEPHONE ENCOUNTER
I was calling pt to remind her to schedule her US that Dr. Ryan ordered for her. Lvm to have pt call the office.

## 2024-10-06 ENCOUNTER — HOSPITAL ENCOUNTER (EMERGENCY)
Age: 29
Discharge: HOME | End: 2024-10-07
Attending: STUDENT IN AN ORGANIZED HEALTH CARE EDUCATION/TRAINING PROGRAM
Payer: COMMERCIAL

## 2024-10-06 VITALS
WEIGHT: 184 LBS | OXYGEN SATURATION: 100 % | HEART RATE: 83 BPM | HEIGHT: 64 IN | RESPIRATION RATE: 16 BRPM | TEMPERATURE: 97.3 F | BODY MASS INDEX: 31.41 KG/M2 | DIASTOLIC BLOOD PRESSURE: 104 MMHG | SYSTOLIC BLOOD PRESSURE: 150 MMHG

## 2024-10-06 DIAGNOSIS — F41.1 ANXIETY STATE: ICD-10-CM

## 2024-10-06 DIAGNOSIS — R07.89 ATYPICAL CHEST PAIN: Primary | ICD-10-CM

## 2024-10-06 PROCEDURE — 99285 EMERGENCY DEPT VISIT HI MDM: CPT

## 2024-10-06 PROCEDURE — 93005 ELECTROCARDIOGRAM TRACING: CPT | Performed by: STUDENT IN AN ORGANIZED HEALTH CARE EDUCATION/TRAINING PROGRAM

## 2024-10-06 ASSESSMENT — PAIN DESCRIPTION - DESCRIPTORS: DESCRIPTORS: ACHING;DISCOMFORT

## 2024-10-06 ASSESSMENT — PAIN SCALES - GENERAL: PAINLEVEL_OUTOF10: 8

## 2024-10-06 ASSESSMENT — PAIN - FUNCTIONAL ASSESSMENT: PAIN_FUNCTIONAL_ASSESSMENT: 0-10

## 2024-10-06 ASSESSMENT — PAIN DESCRIPTION - FREQUENCY: FREQUENCY: CONTINUOUS

## 2024-10-06 ASSESSMENT — PAIN DESCRIPTION - PAIN TYPE: TYPE: ACUTE PAIN

## 2024-10-06 ASSESSMENT — PAIN DESCRIPTION - LOCATION: LOCATION: CHEST

## 2024-10-07 ENCOUNTER — APPOINTMENT (OUTPATIENT)
Dept: GENERAL RADIOLOGY | Age: 29
End: 2024-10-07
Payer: COMMERCIAL

## 2024-10-07 LAB
EKG ATRIAL RATE: 80 BPM
EKG P AXIS: 41 DEGREES
EKG P-R INTERVAL: 150 MS
EKG Q-T INTERVAL: 380 MS
EKG QRS DURATION: 84 MS
EKG QTC CALCULATION (BAZETT): 438 MS
EKG T AXIS: 16 DEGREES
EKG VENTRICULAR RATE: 80 BPM
FLUAV RNA RESP QL NAA+PROBE: NOT DETECTED
FLUBV RNA RESP QL NAA+PROBE: NOT DETECTED
SARS-COV-2 RNA RESP QL NAA+PROBE: NOT DETECTED
SOURCE: NORMAL
SPECIMEN DESCRIPTION: NORMAL

## 2024-10-07 PROCEDURE — 93010 ELECTROCARDIOGRAM REPORT: CPT | Performed by: INTERNAL MEDICINE

## 2024-10-07 PROCEDURE — 71045 X-RAY EXAM CHEST 1 VIEW: CPT

## 2024-10-07 PROCEDURE — 87636 SARSCOV2 & INF A&B AMP PRB: CPT

## 2024-10-07 PROCEDURE — 6370000000 HC RX 637 (ALT 250 FOR IP): Performed by: STUDENT IN AN ORGANIZED HEALTH CARE EDUCATION/TRAINING PROGRAM

## 2024-10-07 RX ORDER — HYDROXYZINE HYDROCHLORIDE 25 MG/1
25 TABLET, FILM COATED ORAL ONCE
Status: COMPLETED | OUTPATIENT
Start: 2024-10-07 | End: 2024-10-07

## 2024-10-07 RX ORDER — IBUPROFEN 800 MG/1
800 TABLET, FILM COATED ORAL ONCE
Status: COMPLETED | OUTPATIENT
Start: 2024-10-07 | End: 2024-10-07

## 2024-10-07 RX ORDER — LORAZEPAM 1 MG/1
0.5 TABLET ORAL EVERY 8 HOURS PRN
Qty: 10 TABLET | Refills: 0 | Status: SHIPPED | OUTPATIENT
Start: 2024-10-07 | End: 2024-10-07

## 2024-10-07 RX ORDER — HYDROXYZINE HYDROCHLORIDE 25 MG/1
25 TABLET, FILM COATED ORAL EVERY 6 HOURS PRN
Qty: 20 TABLET | Refills: 0 | Status: SHIPPED | OUTPATIENT
Start: 2024-10-07 | End: 2024-10-14

## 2024-10-07 RX ORDER — BUTALBITAL, ACETAMINOPHEN AND CAFFEINE 50; 325; 40 MG/1; MG/1; MG/1
1 TABLET ORAL ONCE
Status: COMPLETED | OUTPATIENT
Start: 2024-10-07 | End: 2024-10-07

## 2024-10-07 RX ORDER — LORAZEPAM 0.5 MG/1
0.5 TABLET ORAL ONCE
Status: DISCONTINUED | OUTPATIENT
Start: 2024-10-07 | End: 2024-10-07

## 2024-10-07 RX ADMIN — BUTALBITAL, ACETAMINOPHEN AND CAFFEINE 1 TABLET: 325; 50; 40 TABLET ORAL at 01:44

## 2024-10-07 RX ADMIN — IBUPROFEN 800 MG: 800 TABLET ORAL at 01:44

## 2024-10-07 RX ADMIN — HYDROXYZINE HYDROCHLORIDE 25 MG: 25 TABLET, FILM COATED ORAL at 02:42

## 2024-10-07 ASSESSMENT — PAIN SCALES - GENERAL: PAINLEVEL_OUTOF10: 8

## 2024-10-07 NOTE — ED NOTES
Pt arrived to ED with c/o Anxiety with chest pain and SOB. Pt states she hasn't been able to sleep for 4 days as she jumps out of her sleep. Pt states she's been trying to do deep breathing exercises to help. Pt states she treats for hypertension and depression and is compliant with her medication. Pt reports nausea and diarrhea with a slight decrease in appetite. Pt is A&O x4, vitals stable other than elevated BP, breathing is even and non labored. Pt denies needs at this time, family at bedside and call light within reach.

## 2024-10-09 NOTE — ED PROVIDER NOTES
taking these medications    Details   LORazepam (ATIVAN) 1 MG tablet Take 0.5 tablets by mouth every 8 hours as needed for Anxiety for up to 7 days. Max Daily Amount: 1.5 mg, Disp-10 tablet, R-0Normal             Wyatt Dixon DO  EmergencyMedicine Attending    (Please note that portions of this note were completed with a voice recognition program.  Efforts were made to edit the dictations but occasionally words are mis-transcribed.)     Wyatt Dixon DO  10/09/24 0115

## 2024-10-17 ENCOUNTER — TELEPHONE (OUTPATIENT)
Dept: OBGYN | Age: 29
End: 2024-10-17

## 2024-10-17 NOTE — TELEPHONE ENCOUNTER
Patient no showed for 10/17/24  appointment at [facility] OB/GYN office.  Writer sent a letter for patient to call the office to reschedule appointment.

## 2024-11-12 ENCOUNTER — TELEPHONE (OUTPATIENT)
Dept: OBGYN | Age: 29
End: 2024-11-12

## 2024-11-12 DIAGNOSIS — N89.8 VAGINAL ODOR: Primary | ICD-10-CM

## 2024-11-12 RX ORDER — METRONIDAZOLE 500 MG/1
500 TABLET ORAL 2 TIMES DAILY
Qty: 14 TABLET | Refills: 0 | Status: SHIPPED | OUTPATIENT
Start: 2024-11-12 | End: 2024-11-19

## 2024-11-12 NOTE — TELEPHONE ENCOUNTER
Patient called the office with c/o vaginal discharge and odor and is requesting treatment to be sent to [facility] on [address]

## 2025-02-18 ENCOUNTER — TELEPHONE (OUTPATIENT)
Dept: OBGYN | Age: 30
End: 2025-02-18

## 2025-02-18 NOTE — TELEPHONE ENCOUNTER
Patient did not show for appointment 2/13/25. Called patient to reschedule. No answer, left voicemail.

## 2025-04-14 DIAGNOSIS — N92.6 MISSED PERIOD: Primary | ICD-10-CM

## 2025-04-30 ENCOUNTER — HOSPITAL ENCOUNTER (OUTPATIENT)
Age: 30
Setting detail: SPECIMEN
Discharge: HOME | End: 2025-04-30
Payer: COMMERCIAL

## 2025-04-30 ENCOUNTER — INITIAL PRENATAL (OUTPATIENT)
Age: 30
End: 2025-04-30
Payer: COMMERCIAL

## 2025-04-30 VITALS
HEART RATE: 110 BPM | BODY MASS INDEX: 39.24 KG/M2 | WEIGHT: 228.6 LBS | DIASTOLIC BLOOD PRESSURE: 77 MMHG | SYSTOLIC BLOOD PRESSURE: 127 MMHG

## 2025-04-30 DIAGNOSIS — O09.30 LATE PRENATAL CARE: ICD-10-CM

## 2025-04-30 DIAGNOSIS — Z3A.26 26 WEEKS GESTATION OF PREGNANCY: Primary | ICD-10-CM

## 2025-04-30 DIAGNOSIS — O14.90 PRE-ECLAMPSIA, ANTEPARTUM: ICD-10-CM

## 2025-04-30 DIAGNOSIS — Z3A.26 26 WEEKS GESTATION OF PREGNANCY: ICD-10-CM

## 2025-04-30 LAB
ABO + RH BLD: NORMAL
BACTERIA URNS QL MICRO: ABNORMAL
BILIRUB UR QL STRIP: NEGATIVE
BLOOD GROUP ANTIBODIES SERPL: NEGATIVE
CASTS #/AREA URNS LPF: ABNORMAL /LPF (ref 0–8)
CLARITY UR: CLEAR
COLOR UR: YELLOW
EPI CELLS #/AREA URNS HPF: ABNORMAL /HPF (ref 0–5)
GLUCOSE UR STRIP-MCNC: ABNORMAL MG/DL
HCV AB SERPL QL IA: NONREACTIVE
HGB UR QL STRIP.AUTO: NEGATIVE
HIV 1+2 AB+HIV1 P24 AG SERPL QL IA: NONREACTIVE
KETONES UR STRIP-MCNC: ABNORMAL MG/DL
LEUKOCYTE ESTERASE UR QL STRIP: NEGATIVE
NITRITE UR QL STRIP: NEGATIVE
PH UR STRIP: 6 [PH] (ref 5–8)
PROT UR STRIP-MCNC: NEGATIVE MG/DL
RBC #/AREA URNS HPF: ABNORMAL /HPF (ref 0–4)
SP GR UR STRIP: 1.03 (ref 1–1.03)
UROBILINOGEN UR STRIP-ACNC: NORMAL EU/DL (ref 0–1)
WBC #/AREA URNS HPF: ABNORMAL /HPF (ref 0–5)

## 2025-04-30 PROCEDURE — 87086 URINE CULTURE/COLONY COUNT: CPT

## 2025-04-30 PROCEDURE — 86803 HEPATITIS C AB TEST: CPT

## 2025-04-30 PROCEDURE — G8427 DOCREV CUR MEDS BY ELIG CLIN: HCPCS | Performed by: STUDENT IN AN ORGANIZED HEALTH CARE EDUCATION/TRAINING PROGRAM

## 2025-04-30 PROCEDURE — 86780 TREPONEMA PALLIDUM: CPT

## 2025-04-30 PROCEDURE — 86901 BLOOD TYPING SEROLOGIC RH(D): CPT

## 2025-04-30 PROCEDURE — 87389 HIV-1 AG W/HIV-1&-2 AB AG IA: CPT

## 2025-04-30 PROCEDURE — G8417 CALC BMI ABV UP PARAM F/U: HCPCS | Performed by: STUDENT IN AN ORGANIZED HEALTH CARE EDUCATION/TRAINING PROGRAM

## 2025-04-30 PROCEDURE — 36415 COLL VENOUS BLD VENIPUNCTURE: CPT

## 2025-04-30 PROCEDURE — 1036F TOBACCO NON-USER: CPT | Performed by: STUDENT IN AN ORGANIZED HEALTH CARE EDUCATION/TRAINING PROGRAM

## 2025-04-30 PROCEDURE — 86762 RUBELLA ANTIBODY: CPT

## 2025-04-30 PROCEDURE — 85025 COMPLETE CBC W/AUTO DIFF WBC: CPT

## 2025-04-30 PROCEDURE — 86900 BLOOD TYPING SEROLOGIC ABO: CPT

## 2025-04-30 PROCEDURE — 87340 HEPATITIS B SURFACE AG IA: CPT

## 2025-04-30 PROCEDURE — 99213 OFFICE O/P EST LOW 20 MIN: CPT | Performed by: STUDENT IN AN ORGANIZED HEALTH CARE EDUCATION/TRAINING PROGRAM

## 2025-04-30 PROCEDURE — 86850 RBC ANTIBODY SCREEN: CPT

## 2025-04-30 PROCEDURE — 81001 URINALYSIS AUTO W/SCOPE: CPT

## 2025-04-30 PROCEDURE — 99212 OFFICE O/P EST SF 10 MIN: CPT | Performed by: STUDENT IN AN ORGANIZED HEALTH CARE EDUCATION/TRAINING PROGRAM

## 2025-05-01 ENCOUNTER — RESULTS FOLLOW-UP (OUTPATIENT)
Dept: OBGYN | Age: 30
End: 2025-05-01

## 2025-05-01 LAB
BASOPHILS # BLD: <0.03 K/UL (ref 0–0.2)
BASOPHILS NFR BLD: 0 % (ref 0–2)
EOSINOPHIL # BLD: 0.17 K/UL (ref 0–0.44)
EOSINOPHILS RELATIVE PERCENT: 3 % (ref 1–4)
ERYTHROCYTE [DISTWIDTH] IN BLOOD BY AUTOMATED COUNT: 14.6 % (ref 11.8–14.4)
HBV SURFACE AG SERPL QL IA: NONREACTIVE
HCT VFR BLD AUTO: 33 % (ref 36.3–47.1)
HGB BLD-MCNC: 10.5 G/DL (ref 11.9–15.1)
IMM GRANULOCYTES # BLD AUTO: 0.06 K/UL (ref 0–0.3)
IMM GRANULOCYTES NFR BLD: 1 %
LYMPHOCYTES NFR BLD: 1.26 K/UL (ref 1.1–3.7)
LYMPHOCYTES RELATIVE PERCENT: 18 % (ref 24–43)
MCH RBC QN AUTO: 26 PG (ref 25.2–33.5)
MCHC RBC AUTO-ENTMCNC: 31.8 G/DL (ref 28.4–34.8)
MCV RBC AUTO: 81.7 FL (ref 82.6–102.9)
MICROORGANISM SPEC CULT: NORMAL
MONOCYTES NFR BLD: 0.45 K/UL (ref 0.1–1.2)
MONOCYTES NFR BLD: 7 % (ref 3–12)
NEUTROPHILS NFR BLD: 71 % (ref 36–65)
NEUTS SEG NFR BLD: 4.95 K/UL (ref 1.5–8.1)
NRBC BLD-RTO: 0 PER 100 WBC
PLATELET # BLD AUTO: 159 K/UL (ref 138–453)
PMV BLD AUTO: 11.2 FL (ref 8.1–13.5)
RBC # BLD AUTO: 4.04 M/UL (ref 3.95–5.11)
RBC # BLD: ABNORMAL 10*6/UL
RUBV IGG SERPL QL IA: 38.1 IU/ML
SERVICE CMNT-IMP: NORMAL
SPECIMEN DESCRIPTION: NORMAL
T PALLIDUM AB SER QL IA: NONREACTIVE
WBC OTHER # BLD: 6.9 K/UL (ref 3.5–11.3)

## 2025-05-01 NOTE — PROGRESS NOTES
Attending Physician Statement  I have discussed the care of Valarie Lea, including pertinent history and exam findings,  with the resident. I have reviewed the key elements of all parts of the encounter with the resident.  I agree with the assessment, plan and orders as documented by the resident.  (GE Modifier)    Stella Ferguson,    
error  
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       MEDICATIONS:  Current Outpatient Medications   Medication Sig Dispense Refill    sertraline (ZOLOFT) 25 MG tablet Take 1 tablet by mouth daily Take for 1 week then increase to 50mg qD 7 tablet 0    NIFEdipine (PROCARDIA XL) 90 MG extended release tablet Take 1 tablet by mouth daily 90 tablet 1    ibuprofen (ADVIL;MOTRIN) 600 MG tablet Take 1 tablet by mouth every 6 hours as needed for Pain 120 tablet 1    sertraline (ZOLOFT) 50 MG tablet Take 1 tablet by mouth daily (Patient not taking: Reported on 4/30/2025) 90 tablet 4    acetaminophen (TYLENOL) 500 MG tablet Take 2 tablets by mouth 3 times daily (Patient not taking: Reported on 4/30/2025) 120 tablet 1     No current facility-administered medications for this visit.       ALLERGIES:  Allergies as of 04/30/2025 - Fully Reviewed 04/30/2025   Allergen Reaction Noted    Seasonal  09/23/2024                                   VITALS:  Vitals:    04/30/25 1418   BP: 127/77   Pulse: (!) 110   Weight: 103.7 kg (228 lb 9.6 oz)     FHT:  134  BPM  Fundal height: 25cm                                                                                                                                                               PHYSICAL EXAM:   Chaperone for Intimate Exam: Chaperone was present for entire exam, Chaperone Name: DENITA Guzman    General Appearance: Appears healthy.  Alert; in no acute distress  Respiratory: Normal respiratory effort, no conversational dyspnea  Cardiovascular: Regular rate  Abdomen: Soft, non-distended, non-tender, no rebound guarding or rigidity, no masses appreciated, gravid  Pelvic Exam: Deferred to initial

## 2025-05-07 ENCOUNTER — ROUTINE PRENATAL (OUTPATIENT)
Age: 30
End: 2025-05-07
Payer: COMMERCIAL

## 2025-05-07 VITALS
BODY MASS INDEX: 34.49 KG/M2 | HEIGHT: 64 IN | TEMPERATURE: 97.1 F | SYSTOLIC BLOOD PRESSURE: 115 MMHG | DIASTOLIC BLOOD PRESSURE: 85 MMHG | HEART RATE: 100 BPM | WEIGHT: 202 LBS | RESPIRATION RATE: 16 BRPM

## 2025-05-07 DIAGNOSIS — Z3A.26 26 WEEKS GESTATION OF PREGNANCY: ICD-10-CM

## 2025-05-07 DIAGNOSIS — Z34.90 THIRD PREGNANCY: Primary | ICD-10-CM

## 2025-05-07 DIAGNOSIS — O10.919 CHRONIC HYPERTENSION AFFECTING PREGNANCY: ICD-10-CM

## 2025-05-07 DIAGNOSIS — O09.30 LATE PRENATAL CARE: ICD-10-CM

## 2025-05-07 PROCEDURE — 76811 OB US DETAILED SNGL FETUS: CPT | Performed by: OBSTETRICS & GYNECOLOGY

## 2025-05-07 NOTE — PROGRESS NOTES
[facility] MATERNAL FETAL MED  [address]  [place] [address]  Dept: [phone]     2025    RE:  Valarie Lea     : 1995   AGE: 29 y.o.     Dear Dr. Sofia,    Thank you for allowing me to see Valarie Lea.  As I'm sure you will recall, Valraie Lea is a 29 y.o. [id number]'s last menstrual period was 07/10/2023. Estimated Date of Delivery: 25 at 26w4d seen in our office today for the following:    REASON FOR VISIT: Level II    Patient Active Problem List    Diagnosis Date Noted    Third pregnancy 2025    Late prenatal care 2025    Chronic hypertension affecting pregnancy 2025    26 weeks gestation of pregnancy 2025    hx PreE w/ SF (G2) 2025    PreE w/ SF (G2) 2024    Limited/No PNC (G2) 2024    Trichomoniasis 2017        PAST HISTORY:  OB History    Para Term  AB Living   3 2 2   2   SAB IAB Ectopic Molar Multiple Live Births       0 2      # Outcome Date GA Lbr Abhay/2nd Weight Sex Type Anes PTL Lv   3 Current            2 Term 24 38w0d  3.062 kg (6 lb 12 oz) M Vag-Spont None N HENNY   1 Term 08/15/17 38w6d / 00:28 3.345 kg (7 lb 6 oz) M Vag-Spont EPI N HENNY          MEDICAL:  Past Medical History:   Diagnosis Date    Headache      @ 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       ALLERGIES:    Allergies   Allergen Reactions    Seasonal          MEDICATIONS:    Current Outpatient Medications   Medication Sig Dispense Refill    sertraline (ZOLOFT) 25 MG tablet Take 1 tablet by mouth daily Take for 1 week then increase to 50mg qD 7 tablet 0    NIFEdipine (PROCARDIA XL) 90 MG extended release tablet Take 1 tablet by mouth daily 90 tablet 1

## 2025-06-04 ENCOUNTER — ROUTINE PRENATAL (OUTPATIENT)
Age: 30
End: 2025-06-04
Payer: COMMERCIAL

## 2025-06-04 VITALS
DIASTOLIC BLOOD PRESSURE: 70 MMHG | RESPIRATION RATE: 16 BRPM | HEART RATE: 105 BPM | TEMPERATURE: 97.9 F | BODY MASS INDEX: 33.8 KG/M2 | HEIGHT: 64 IN | SYSTOLIC BLOOD PRESSURE: 129 MMHG | WEIGHT: 198 LBS

## 2025-06-04 DIAGNOSIS — O10.919 CHRONIC HYPERTENSION AFFECTING PREGNANCY: ICD-10-CM

## 2025-06-04 DIAGNOSIS — Z3A.30 30 WEEKS GESTATION OF PREGNANCY: Primary | ICD-10-CM

## 2025-06-04 DIAGNOSIS — Z34.90 THIRD PREGNANCY: ICD-10-CM

## 2025-06-04 PROCEDURE — 76816 OB US FOLLOW-UP PER FETUS: CPT | Performed by: OBSTETRICS & GYNECOLOGY

## 2025-06-04 PROCEDURE — 76819 FETAL BIOPHYS PROFIL W/O NST: CPT | Performed by: OBSTETRICS & GYNECOLOGY

## 2025-06-04 NOTE — PROGRESS NOTES
[facility] MATERNAL FETAL MED  [address]  [place] [address]  Dept: [phone]     2025    RE:  Valarie Lea     : 1995   AGE: 29 y.o.     Dear Dr. Ferguson,    Thank you for allowing me to see Valarie Lea.  As I'm sure you will recall, Valarie Lea is a 29 y.o. [id number]'s last menstrual period was 07/10/2023. Estimated Date of Delivery: 25 at 30w4d seen in our office today for the following:    REASON FOR VISIT: Growth     Patient Active Problem List    Diagnosis Date Noted    Third pregnancy 2025    Late prenatal care 2025    Chronic hypertension affecting pregnancy 2025    30 weeks gestation of pregnancy 2025    hx PreE w/ SF (G2) 2025    PreE w/ SF (G2) 2024    Limited/No PNC (G2) 2024    Trichomoniasis 2017        PAST HISTORY:  OB History    Para Term  AB Living   3 2 2   2   SAB IAB Ectopic Molar Multiple Live Births       0 2      # Outcome Date GA Lbr Abhay/2nd Weight Sex Type Anes PTL Lv   3 Current            2 Term 24 38w0d  3.062 kg (6 lb 12 oz) M Vag-Spont None N HENNY   1 Term 08/15/17 38w6d / 00:28 3.345 kg (7 lb 6 oz) M Vag-Spont EPI N HENNY          MEDICAL:  Past Medical History:   Diagnosis Date    Headache      @ 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       ALLERGIES:    Allergies   Allergen Reactions    Environmental/Seasonal          MEDICATIONS:    Current Outpatient Medications   Medication Sig Dispense Refill    sertraline (ZOLOFT) 25 MG tablet Take 1 tablet by mouth daily Take for 1 week then increase to 50mg qD 7 tablet 0    NIFEdipine (PROCARDIA XL) 90 MG extended release tablet Take 1 tablet by mouth daily

## 2025-06-12 ENCOUNTER — TELEPHONE (OUTPATIENT)
Age: 30
End: 2025-06-12

## 2025-06-12 NOTE — TELEPHONE ENCOUNTER
Patient returned phone call and was scheduled. Patient was advised she needs to continue seeing [facility] O[facility] as this office is her primary OBGYN or [facility] will stop seeing her. Patient voiced understanding.

## 2025-06-12 NOTE — TELEPHONE ENCOUNTER
----- Message from Dr. Stella Ferguson DO sent at 6/12/2025  1:36 PM EDT -----  Can we reach out to this patient to get her scheduled for a follow up appt with NST? She has only been seeing M.  ----- Message -----  From: Hermelinda Garcia MA  Sent: 6/4/2025   2:25 PM EDT  To: Stella Ferguson DO

## 2025-06-12 NOTE — TELEPHONE ENCOUNTER
Called patient to schedule VERENICE and NST. No answer; left HIPAA-compliant voicemail with office phone number.

## 2025-08-05 ENCOUNTER — HOSPITAL ENCOUNTER (INPATIENT)
Age: 30
LOS: 2 days | Discharge: HOME | DRG: 560 | End: 2025-08-07
Attending: OBSTETRICS & GYNECOLOGY | Admitting: OBSTETRICS & GYNECOLOGY
Payer: COMMERCIAL

## 2025-08-05 PROBLEM — A59.9 TRICHOMONIASIS: Status: RESOLVED | Noted: 2017-05-16 | Resolved: 2025-08-05

## 2025-08-05 PROBLEM — Z3A.39 39 WEEKS GESTATION OF PREGNANCY: Status: ACTIVE | Noted: 2025-08-05

## 2025-08-05 LAB
ABO + RH BLD: NORMAL
ALBUMIN SERPL-MCNC: 3.3 G/DL (ref 3.5–5.2)
ALBUMIN/GLOB SERPL: 1.1 {RATIO} (ref 1–2.5)
ALP SERPL-CCNC: 148 U/L (ref 35–104)
ALT SERPL-CCNC: 7 U/L (ref 10–35)
AMPHET UR QL SCN: NEGATIVE
ANION GAP SERPL CALCULATED.3IONS-SCNC: 14 MMOL/L (ref 9–16)
ARM BAND NUMBER: NORMAL
AST SERPL-CCNC: 16 U/L (ref 10–35)
BARBITURATES UR QL SCN: NEGATIVE
BASOPHILS # BLD: <0.03 K/UL (ref 0–0.2)
BASOPHILS NFR BLD: 0 % (ref 0–2)
BENZODIAZ UR QL: NEGATIVE
BILIRUB SERPL-MCNC: 0.3 MG/DL (ref 0–1.2)
BLOOD BANK SAMPLE EXPIRATION: NORMAL
BLOOD GROUP ANTIBODIES SERPL: NEGATIVE
BUN SERPL-MCNC: 3 MG/DL (ref 6–20)
CALCIUM SERPL-MCNC: 8.8 MG/DL (ref 8.6–10.4)
CANNABINOIDS UR QL SCN: NEGATIVE
CHLORIDE SERPL-SCNC: 106 MMOL/L (ref 98–107)
CO2 SERPL-SCNC: 18 MMOL/L (ref 20–31)
COCAINE UR QL SCN: NEGATIVE
CREAT SERPL-MCNC: 0.4 MG/DL (ref 0.6–0.9)
CREAT UR-MCNC: 286 MG/DL (ref 28–217)
EOSINOPHIL # BLD: 0.1 K/UL (ref 0–0.44)
EOSINOPHILS RELATIVE PERCENT: 2 % (ref 1–4)
ERYTHROCYTE [DISTWIDTH] IN BLOOD BY AUTOMATED COUNT: 15.4 % (ref 11.8–14.4)
FENTANYL UR QL: NEGATIVE
GFR, ESTIMATED: >90 ML/MIN/1.73M2
GLUCOSE SERPL-MCNC: 87 MG/DL (ref 74–99)
HCT VFR BLD AUTO: 32.8 % (ref 36.3–47.1)
HGB BLD-MCNC: 10.3 G/DL (ref 11.9–15.1)
IMM GRANULOCYTES # BLD AUTO: 0.03 K/UL (ref 0–0.3)
IMM GRANULOCYTES NFR BLD: 1 %
LYMPHOCYTES NFR BLD: 1.01 K/UL (ref 1.1–3.7)
LYMPHOCYTES RELATIVE PERCENT: 21 % (ref 24–43)
MCH RBC QN AUTO: 23.9 PG (ref 25.2–33.5)
MCHC RBC AUTO-ENTMCNC: 31.4 G/DL (ref 28.4–34.8)
MCV RBC AUTO: 76.1 FL (ref 82.6–102.9)
METHADONE UR QL: NEGATIVE
MONOCYTES NFR BLD: 0.34 K/UL (ref 0.1–1.2)
MONOCYTES NFR BLD: 7 % (ref 3–12)
NEUTROPHILS NFR BLD: 70 % (ref 36–65)
NEUTS SEG NFR BLD: 3.4 K/UL (ref 1.5–8.1)
NRBC BLD-RTO: 0 PER 100 WBC
OPIATES UR QL SCN: NEGATIVE
OXYCODONE UR QL SCN: NEGATIVE
PCP UR QL SCN: NEGATIVE
PLATELET # BLD AUTO: ABNORMAL K/UL (ref 138–453)
PLATELET, FLUORESCENCE: 127 K/UL (ref 138–453)
PLATELETS.RETICULATED NFR BLD AUTO: 4.3 % (ref 1.1–10.3)
POTASSIUM SERPL-SCNC: 3.4 MMOL/L (ref 3.7–5.3)
PROT SERPL-MCNC: 6.4 G/DL (ref 6.6–8.7)
RBC # BLD AUTO: 4.31 M/UL (ref 3.95–5.11)
RBC # BLD: ABNORMAL 10*6/UL
SODIUM SERPL-SCNC: 138 MMOL/L (ref 136–145)
T PALLIDUM AB SER QL IA: NONREACTIVE
TEST INFORMATION: NORMAL
TOTAL PROTEIN, URINE: 30 MG/DL
URINE TOTAL PROTEIN CREATININE RATIO: 0.1 (ref 0–0.2)
WBC OTHER # BLD: 4.9 K/UL (ref 3.5–11.3)

## 2025-08-05 PROCEDURE — 6360000002 HC RX W HCPCS

## 2025-08-05 PROCEDURE — 82570 ASSAY OF URINE CREATININE: CPT

## 2025-08-05 PROCEDURE — 87081 CULTURE SCREEN ONLY: CPT

## 2025-08-05 PROCEDURE — 86900 BLOOD TYPING SEROLOGIC ABO: CPT

## 2025-08-05 PROCEDURE — 84156 ASSAY OF PROTEIN URINE: CPT

## 2025-08-05 PROCEDURE — 85055 RETICULATED PLATELET ASSAY: CPT

## 2025-08-05 PROCEDURE — 10907ZC DRAINAGE OF AMNIOTIC FLUID, THERAPEUTIC FROM PRODUCTS OF CONCEPTION, VIA NATURAL OR ARTIFICIAL OPENING: ICD-10-PCS | Performed by: OBSTETRICS & GYNECOLOGY

## 2025-08-05 PROCEDURE — 6370000000 HC RX 637 (ALT 250 FOR IP)

## 2025-08-05 PROCEDURE — 59409 OBSTETRICAL CARE: CPT | Performed by: OBSTETRICS & GYNECOLOGY

## 2025-08-05 PROCEDURE — 2500000003 HC RX 250 WO HCPCS

## 2025-08-05 PROCEDURE — 85025 COMPLETE CBC W/AUTO DIFF WBC: CPT

## 2025-08-05 PROCEDURE — 86850 RBC ANTIBODY SCREEN: CPT

## 2025-08-05 PROCEDURE — 80053 COMPREHEN METABOLIC PANEL: CPT

## 2025-08-05 PROCEDURE — 86901 BLOOD TYPING SEROLOGIC RH(D): CPT

## 2025-08-05 PROCEDURE — 86780 TREPONEMA PALLIDUM: CPT

## 2025-08-05 PROCEDURE — 80307 DRUG TEST PRSMV CHEM ANLYZR: CPT

## 2025-08-05 PROCEDURE — 1220000000 HC SEMI PRIVATE OB R&B

## 2025-08-05 PROCEDURE — 7200000001 HC VAGINAL DELIVERY

## 2025-08-05 RX ORDER — ACETAMINOPHEN 500 MG
1000 TABLET ORAL EVERY 6 HOURS
Status: DISCONTINUED | OUTPATIENT
Start: 2025-08-05 | End: 2025-08-07 | Stop reason: HOSPADM

## 2025-08-05 RX ORDER — SENNOSIDES 8.6 MG/1
1 TABLET ORAL 2 TIMES DAILY
Qty: 60 TABLET | Refills: 1 | Status: SHIPPED | OUTPATIENT
Start: 2025-08-05 | End: 2026-08-05

## 2025-08-05 RX ORDER — IBUPROFEN 600 MG/1
600 TABLET, FILM COATED ORAL EVERY 6 HOURS
Status: DISCONTINUED | OUTPATIENT
Start: 2025-08-05 | End: 2025-08-05

## 2025-08-05 RX ORDER — NIFEDIPINE 30 MG/1
30 TABLET, EXTENDED RELEASE ORAL DAILY
Status: DISCONTINUED | OUTPATIENT
Start: 2025-08-05 | End: 2025-08-05

## 2025-08-05 RX ORDER — ACETAMINOPHEN 500 MG
1000 TABLET ORAL EVERY 6 HOURS PRN
Qty: 80 TABLET | Refills: 1 | Status: SHIPPED | OUTPATIENT
Start: 2025-08-05 | End: 2025-09-04

## 2025-08-05 RX ORDER — SODIUM CHLORIDE, SODIUM LACTATE, POTASSIUM CHLORIDE, AND CALCIUM CHLORIDE .6; .31; .03; .02 G/100ML; G/100ML; G/100ML; G/100ML
1000 INJECTION, SOLUTION INTRAVENOUS PRN
Status: DISCONTINUED | OUTPATIENT
Start: 2025-08-05 | End: 2025-08-05

## 2025-08-05 RX ORDER — ONDANSETRON 2 MG/ML
4 INJECTION INTRAMUSCULAR; INTRAVENOUS EVERY 6 HOURS PRN
Status: DISCONTINUED | OUTPATIENT
Start: 2025-08-05 | End: 2025-08-05

## 2025-08-05 RX ORDER — CALCIUM CARBONATE 500 MG/1
500 TABLET, CHEWABLE ORAL 3 TIMES DAILY PRN
Status: DISCONTINUED | OUTPATIENT
Start: 2025-08-05 | End: 2025-08-07 | Stop reason: HOSPADM

## 2025-08-05 RX ORDER — ONDANSETRON 4 MG/1
4 TABLET, ORALLY DISINTEGRATING ORAL EVERY 6 HOURS PRN
Status: DISCONTINUED | OUTPATIENT
Start: 2025-08-05 | End: 2025-08-05

## 2025-08-05 RX ORDER — SODIUM CHLORIDE 9 MG/ML
INJECTION, SOLUTION INTRAVENOUS PRN
Status: DISCONTINUED | OUTPATIENT
Start: 2025-08-05 | End: 2025-08-07 | Stop reason: HOSPADM

## 2025-08-05 RX ORDER — LOPERAMIDE HYDROCHLORIDE 2 MG/1
2 CAPSULE ORAL PRN
Status: DISCONTINUED | OUTPATIENT
Start: 2025-08-05 | End: 2025-08-05

## 2025-08-05 RX ORDER — NIFEDIPINE 30 MG/1
60 TABLET, EXTENDED RELEASE ORAL DAILY
Status: DISCONTINUED | OUTPATIENT
Start: 2025-08-06 | End: 2025-08-07 | Stop reason: HOSPADM

## 2025-08-05 RX ORDER — SODIUM CHLORIDE 0.9 % (FLUSH) 0.9 %
5-40 SYRINGE (ML) INJECTION EVERY 12 HOURS SCHEDULED
Status: DISCONTINUED | OUTPATIENT
Start: 2025-08-05 | End: 2025-08-07 | Stop reason: HOSPADM

## 2025-08-05 RX ORDER — ONDANSETRON 4 MG/1
4 TABLET, ORALLY DISINTEGRATING ORAL EVERY 6 HOURS PRN
Status: DISCONTINUED | OUTPATIENT
Start: 2025-08-05 | End: 2025-08-07 | Stop reason: HOSPADM

## 2025-08-05 RX ORDER — MEDROXYPROGESTERONE ACETATE 150 MG/ML
150 INJECTION, SUSPENSION INTRAMUSCULAR ONCE
Status: COMPLETED | OUTPATIENT
Start: 2025-08-05 | End: 2025-08-05

## 2025-08-05 RX ORDER — ONDANSETRON 2 MG/ML
4 INJECTION INTRAMUSCULAR; INTRAVENOUS EVERY 6 HOURS PRN
Status: DISCONTINUED | OUTPATIENT
Start: 2025-08-05 | End: 2025-08-07 | Stop reason: HOSPADM

## 2025-08-05 RX ORDER — SODIUM CHLORIDE 0.9 % (FLUSH) 0.9 %
5-40 SYRINGE (ML) INJECTION EVERY 12 HOURS SCHEDULED
Status: DISCONTINUED | OUTPATIENT
Start: 2025-08-05 | End: 2025-08-05

## 2025-08-05 RX ORDER — IBUPROFEN 600 MG/1
600 TABLET, FILM COATED ORAL EVERY 6 HOURS
Status: DISCONTINUED | OUTPATIENT
Start: 2025-08-05 | End: 2025-08-07 | Stop reason: HOSPADM

## 2025-08-05 RX ORDER — SODIUM CHLORIDE, SODIUM LACTATE, POTASSIUM CHLORIDE, AND CALCIUM CHLORIDE .6; .31; .03; .02 G/100ML; G/100ML; G/100ML; G/100ML
500 INJECTION, SOLUTION INTRAVENOUS PRN
Status: DISCONTINUED | OUTPATIENT
Start: 2025-08-05 | End: 2025-08-05

## 2025-08-05 RX ORDER — ACETAMINOPHEN 500 MG
1000 TABLET ORAL EVERY 6 HOURS SCHEDULED
Status: DISCONTINUED | OUTPATIENT
Start: 2025-08-05 | End: 2025-08-05

## 2025-08-05 RX ORDER — ACETAMINOPHEN 500 MG
1000 TABLET ORAL EVERY 6 HOURS PRN
Status: DISCONTINUED | OUTPATIENT
Start: 2025-08-05 | End: 2025-08-05

## 2025-08-05 RX ORDER — SENNA AND DOCUSATE SODIUM 50; 8.6 MG/1; MG/1
2 TABLET, FILM COATED ORAL NIGHTLY
Status: DISCONTINUED | OUTPATIENT
Start: 2025-08-05 | End: 2025-08-07 | Stop reason: HOSPADM

## 2025-08-05 RX ORDER — SODIUM CHLORIDE, SODIUM LACTATE, POTASSIUM CHLORIDE, CALCIUM CHLORIDE 600; 310; 30; 20 MG/100ML; MG/100ML; MG/100ML; MG/100ML
INJECTION, SOLUTION INTRAVENOUS CONTINUOUS
Status: DISCONTINUED | OUTPATIENT
Start: 2025-08-05 | End: 2025-08-05

## 2025-08-05 RX ORDER — SODIUM CHLORIDE 9 MG/ML
INJECTION, SOLUTION INTRAVENOUS PRN
Status: DISCONTINUED | OUTPATIENT
Start: 2025-08-05 | End: 2025-08-05

## 2025-08-05 RX ORDER — DIPHENHYDRAMINE HCL 25 MG
25 TABLET ORAL EVERY 6 HOURS PRN
Status: DISCONTINUED | OUTPATIENT
Start: 2025-08-05 | End: 2025-08-07 | Stop reason: HOSPADM

## 2025-08-05 RX ORDER — SODIUM CHLORIDE 0.9 % (FLUSH) 0.9 %
5-40 SYRINGE (ML) INJECTION PRN
Status: DISCONTINUED | OUTPATIENT
Start: 2025-08-05 | End: 2025-08-05

## 2025-08-05 RX ORDER — NIFEDIPINE 30 MG/1
30 TABLET, EXTENDED RELEASE ORAL ONCE
Status: COMPLETED | OUTPATIENT
Start: 2025-08-05 | End: 2025-08-05

## 2025-08-05 RX ORDER — SODIUM CHLORIDE 0.9 % (FLUSH) 0.9 %
5-40 SYRINGE (ML) INJECTION PRN
Status: DISCONTINUED | OUTPATIENT
Start: 2025-08-05 | End: 2025-08-07 | Stop reason: HOSPADM

## 2025-08-05 RX ORDER — IBUPROFEN 600 MG/1
600 TABLET, FILM COATED ORAL EVERY 6 HOURS PRN
Qty: 60 TABLET | Refills: 1 | Status: SHIPPED | OUTPATIENT
Start: 2025-08-05

## 2025-08-05 RX ADMIN — SODIUM CHLORIDE, PRESERVATIVE FREE 10 ML: 5 INJECTION INTRAVENOUS at 21:47

## 2025-08-05 RX ADMIN — ACETAMINOPHEN 1000 MG: 500 TABLET ORAL at 21:47

## 2025-08-05 RX ADMIN — IBUPROFEN 600 MG: 600 TABLET ORAL at 12:58

## 2025-08-05 RX ADMIN — ACETAMINOPHEN 1000 MG: 500 TABLET ORAL at 14:48

## 2025-08-05 RX ADMIN — NIFEDIPINE 30 MG: 30 TABLET, FILM COATED, EXTENDED RELEASE ORAL at 13:30

## 2025-08-05 RX ADMIN — BENZOCAINE AND LEVOMENTHOL: 200; 5 SPRAY TOPICAL at 14:48

## 2025-08-05 RX ADMIN — MEDROXYPROGESTERONE ACETATE 150 MG: 150 INJECTION, SUSPENSION INTRAMUSCULAR at 18:49

## 2025-08-05 RX ADMIN — IBUPROFEN 600 MG: 600 TABLET ORAL at 18:46

## 2025-08-05 RX ADMIN — Medication 5 MG: at 21:47

## 2025-08-05 RX ADMIN — NIFEDIPINE 30 MG: 30 TABLET, FILM COATED, EXTENDED RELEASE ORAL at 21:47

## 2025-08-05 ASSESSMENT — PAIN SCALES - GENERAL
PAINLEVEL_OUTOF10: 10
PAINLEVEL_OUTOF10: 8
PAINLEVEL_OUTOF10: 7
PAINLEVEL_OUTOF10: 2

## 2025-08-05 ASSESSMENT — PAIN DESCRIPTION - DESCRIPTORS
DESCRIPTORS: CRAMPING
DESCRIPTORS: ACHING
DESCRIPTORS: CRAMPING
DESCRIPTORS: CRAMPING

## 2025-08-05 ASSESSMENT — PAIN DESCRIPTION - ORIENTATION
ORIENTATION: MID;LOWER
ORIENTATION: MID;LOWER

## 2025-08-05 ASSESSMENT — PAIN DESCRIPTION - LOCATION
LOCATION: ABDOMEN
LOCATION: PELVIS

## 2025-08-06 PROCEDURE — 6370000000 HC RX 637 (ALT 250 FOR IP)

## 2025-08-06 PROCEDURE — 2500000003 HC RX 250 WO HCPCS

## 2025-08-06 PROCEDURE — 1220000000 HC SEMI PRIVATE OB R&B

## 2025-08-06 RX ADMIN — IBUPROFEN 600 MG: 600 TABLET ORAL at 23:45

## 2025-08-06 RX ADMIN — SODIUM CHLORIDE, PRESERVATIVE FREE 10 ML: 5 INJECTION INTRAVENOUS at 20:24

## 2025-08-06 RX ADMIN — SENNOSIDES, DOCUSATE SODIUM 2 TABLET: 50; 8.6 TABLET, FILM COATED ORAL at 20:48

## 2025-08-06 RX ADMIN — ACETAMINOPHEN 1000 MG: 500 TABLET ORAL at 20:48

## 2025-08-06 RX ADMIN — ACETAMINOPHEN 1000 MG: 500 TABLET ORAL at 12:39

## 2025-08-06 RX ADMIN — NIFEDIPINE 60 MG: 30 TABLET, FILM COATED, EXTENDED RELEASE ORAL at 09:44

## 2025-08-06 RX ADMIN — IBUPROFEN 600 MG: 600 TABLET ORAL at 09:44

## 2025-08-06 RX ADMIN — SODIUM CHLORIDE, PRESERVATIVE FREE 10 ML: 5 INJECTION INTRAVENOUS at 09:00

## 2025-08-06 RX ADMIN — ACETAMINOPHEN 1000 MG: 500 TABLET ORAL at 07:06

## 2025-08-06 RX ADMIN — IBUPROFEN 600 MG: 600 TABLET ORAL at 15:59

## 2025-08-06 ASSESSMENT — PAIN DESCRIPTION - LOCATION
LOCATION: HEAD
LOCATION: HEAD
LOCATION: ABDOMEN
LOCATION: PELVIS
LOCATION: ABDOMEN
LOCATION: HEAD

## 2025-08-06 ASSESSMENT — PAIN DESCRIPTION - DESCRIPTORS
DESCRIPTORS: DISCOMFORT;DULL
DESCRIPTORS: ACHING
DESCRIPTORS: ACHING;DISCOMFORT
DESCRIPTORS: ACHING;DULL
DESCRIPTORS: ACHING;DISCOMFORT
DESCRIPTORS: ACHING;DISCOMFORT

## 2025-08-06 ASSESSMENT — PAIN DESCRIPTION - RADICULAR PAIN: RADICULAR_PAIN: ABSENT

## 2025-08-06 ASSESSMENT — PAIN DESCRIPTION - PAIN TYPE
TYPE: ACUTE PAIN
TYPE: ACUTE PAIN

## 2025-08-06 ASSESSMENT — PAIN SCALES - GENERAL
PAINLEVEL_OUTOF10: 5
PAINLEVEL_OUTOF10: 6
PAINLEVEL_OUTOF10: 4
PAINLEVEL_OUTOF10: 6
PAINLEVEL_OUTOF10: 5
PAINLEVEL_OUTOF10: 5

## 2025-08-06 ASSESSMENT — PAIN DESCRIPTION - ORIENTATION
ORIENTATION: MID;LOWER
ORIENTATION: ANTERIOR
ORIENTATION: MID;LOWER
ORIENTATION: MID
ORIENTATION: MID;LOWER

## 2025-08-06 ASSESSMENT — PAIN - FUNCTIONAL ASSESSMENT
PAIN_FUNCTIONAL_ASSESSMENT: ACTIVITIES ARE NOT PREVENTED

## 2025-08-07 VITALS
OXYGEN SATURATION: 98 % | TEMPERATURE: 98.8 F | HEART RATE: 102 BPM | RESPIRATION RATE: 18 BRPM | SYSTOLIC BLOOD PRESSURE: 133 MMHG | DIASTOLIC BLOOD PRESSURE: 88 MMHG

## 2025-08-07 PROCEDURE — 6370000000 HC RX 637 (ALT 250 FOR IP)

## 2025-08-07 RX ORDER — NIFEDIPINE 60 MG/1
60 TABLET, EXTENDED RELEASE ORAL DAILY
Qty: 90 TABLET | Refills: 1 | Status: SHIPPED | OUTPATIENT
Start: 2025-08-07

## 2025-08-07 RX ADMIN — NIFEDIPINE 60 MG: 30 TABLET, FILM COATED, EXTENDED RELEASE ORAL at 09:07

## 2025-08-07 RX ADMIN — IBUPROFEN 600 MG: 600 TABLET ORAL at 09:07

## 2025-08-07 ASSESSMENT — PAIN SCALES - GENERAL: PAINLEVEL_OUTOF10: 0

## 2025-08-07 ASSESSMENT — PAIN DESCRIPTION - RADICULAR PAIN: RADICULAR_PAIN: ABSENT

## 2025-08-08 LAB
MICROORGANISM SPEC CULT: NORMAL
SERVICE CMNT-IMP: NORMAL
SPECIMEN DESCRIPTION: NORMAL

## 2025-08-11 LAB — SURGICAL PATHOLOGY REPORT: NORMAL
